# Patient Record
Sex: MALE | Race: WHITE | Employment: FULL TIME | URBAN - METROPOLITAN AREA
[De-identification: names, ages, dates, MRNs, and addresses within clinical notes are randomized per-mention and may not be internally consistent; named-entity substitution may affect disease eponyms.]

---

## 2017-04-28 PROBLEM — R22.1 NECK MASS: Status: ACTIVE | Noted: 2017-04-28

## 2017-11-07 NOTE — LETTER
2025    Efraín Dykes  : 1960    Dear Ronnie Turner MD,   Your patient, Efraín Dykes, is scheduled to have Lumbar 3 - Lumbar 4, Lumbar 4 - Lumbar 5 Decompression Fusion with Dr. Jeannine Acevedo MD. This procedure will be Inpatient at [facility] on 25.    Medical clearance is necessary.  Required preoperative testing to be ordered:  H&P  CBC with differential and platelet   CMP  PTT/PT/INR  UA with reflex to culture  MRSA/MSSA nasal swab  Chest X-ray (50 years or older)  EKG (tracing needed)  Per Dr. Acevedo's spine surgery guidelines, patient must hold Aspirin for 10 days prior to surgery and restart 7 days after surgery.  Please advise if the patient can hold Aspirin for 10 days prior to surgery and restart 7 days after surgery.    Please fax all clearances directly to the surgical facility [phone] and fax a copy to the surgeon's office at [phone] attention: [place].    For any questions, please contact our office directly at [phone].     Sincerely,   Dr. Jeannine Acevedo MD      
Yes

## 2018-05-30 PROBLEM — S90.851A FOREIGN BODY IN RIGHT FOOT, INITIAL ENCOUNTER: Status: ACTIVE | Noted: 2018-05-30

## 2018-08-30 PROBLEM — L60.0 INGROWN NAIL OF GREAT TOE OF LEFT FOOT: Status: ACTIVE | Noted: 2018-08-30

## 2018-10-25 PROCEDURE — 86803 HEPATITIS C AB TEST: CPT | Performed by: INTERNAL MEDICINE

## 2018-10-28 PROBLEM — R97.20 PSA ELEVATION: Status: ACTIVE | Noted: 2018-10-28

## 2019-02-05 ENCOUNTER — LAB ENCOUNTER (OUTPATIENT)
Dept: LAB | Age: 59
End: 2019-02-05
Attending: UROLOGY
Payer: COMMERCIAL

## 2019-02-05 DIAGNOSIS — N50.0 BILATERAL TESTICULAR ATROPHY: Primary | ICD-10-CM

## 2019-02-05 LAB
PSA FREE MFR SERPL: 16 %
PSA FREE SERPL-MCNC: 0.7 NG/ML
PSA SERPL-MCNC: 4.4 NG/ML (ref 0.01–4)
TESTOST SERPL-MCNC: 270.2 NG/DL (ref 241–827)

## 2019-02-05 PROCEDURE — 84154 ASSAY OF PSA FREE: CPT

## 2019-02-05 PROCEDURE — 84153 ASSAY OF PSA TOTAL: CPT

## 2019-02-05 PROCEDURE — 84403 ASSAY OF TOTAL TESTOSTERONE: CPT

## 2019-11-25 PROBLEM — M06.4 UNDIFFERENTIATED INFLAMMATORY POLYARTHRITIS (HCC): Status: RESOLVED | Noted: 2019-11-25 | Resolved: 2019-11-25

## 2019-11-25 PROBLEM — J30.9 ALLERGIC RHINITIS: Status: ACTIVE | Noted: 2019-11-25

## 2019-11-25 PROBLEM — H69.93 ETD (EUSTACHIAN TUBE DYSFUNCTION), BILATERAL: Status: ACTIVE | Noted: 2019-11-25

## 2019-11-25 PROBLEM — R97.20 PSA ELEVATION: Status: RESOLVED | Noted: 2018-10-28 | Resolved: 2019-11-25

## 2019-11-25 PROBLEM — C61 PROSTATE CANCER (HCC): Status: ACTIVE | Noted: 2019-11-25

## 2019-11-25 PROBLEM — M06.4 UNDIFFERENTIATED INFLAMMATORY POLYARTHRITIS (HCC): Status: ACTIVE | Noted: 2019-11-25

## 2019-11-25 PROBLEM — R22.1 NECK MASS: Status: RESOLVED | Noted: 2017-04-28 | Resolved: 2019-11-25

## 2019-11-25 PROBLEM — H69.83 ETD (EUSTACHIAN TUBE DYSFUNCTION), BILATERAL: Status: ACTIVE | Noted: 2019-11-25

## 2019-11-25 PROBLEM — L60.0 INGROWN NAIL OF GREAT TOE OF LEFT FOOT: Status: RESOLVED | Noted: 2018-08-30 | Resolved: 2019-11-25

## 2019-11-25 PROBLEM — S90.851A FOREIGN BODY IN RIGHT FOOT, INITIAL ENCOUNTER: Status: RESOLVED | Noted: 2018-05-30 | Resolved: 2019-11-25

## 2019-11-25 PROBLEM — H90.6 MIXED CONDUCTIVE AND SENSORINEURAL HEARING LOSS OF BOTH EARS: Status: ACTIVE | Noted: 2019-10-14

## 2020-11-30 PROBLEM — K43.9 VENTRAL HERNIA WITHOUT OBSTRUCTION OR GANGRENE: Status: ACTIVE | Noted: 2020-11-30

## 2021-11-17 PROBLEM — M79.602 LEFT ARM PAIN: Status: ACTIVE | Noted: 2021-11-17

## 2021-11-17 PROBLEM — M77.12 LEFT LATERAL EPICONDYLITIS: Status: ACTIVE | Noted: 2021-11-17

## 2021-11-17 PROBLEM — H40.9 GLAUCOMA: Status: ACTIVE | Noted: 2021-01-11

## 2021-11-17 PROBLEM — K51.90 ULCERATIVE COLITIS (HCC): Status: ACTIVE | Noted: 2021-01-11

## 2025-02-24 ENCOUNTER — TELEMEDICINE (OUTPATIENT)
Dept: SURGERY | Facility: CLINIC | Age: 65
End: 2025-02-24
Payer: MEDICARE

## 2025-02-24 DIAGNOSIS — M51.26 HNP (HERNIATED NUCLEUS PULPOSUS), LUMBAR: ICD-10-CM

## 2025-02-24 DIAGNOSIS — M48.062 SPINAL STENOSIS OF LUMBAR REGION WITH NEUROGENIC CLAUDICATION: Primary | ICD-10-CM

## 2025-02-24 DIAGNOSIS — M43.17 SPONDYLOLISTHESIS OF LUMBOSACRAL REGION: ICD-10-CM

## 2025-02-24 NOTE — PROGRESS NOTES
[facility] - ORTHOPEDICS  [phone]     SPINE CONSULTATION        The following individual(s) verbally consented to be recorded using ambient AI listening technology and understand that they can each withdraw their consent to this listening technology at any point by asking the clinician to turn off or pause the recording:    Patient name: Efraín Dykes  Additional names:           Name: Efraín Dykes   MRN: [id number]  Date: 2/24/2025     CC: No chief complaint on file.       HPI:   History of Present Illness  Efraín Dykes is a 64 year old male with spinal stenosis and spondylolisthesis who presents with severe left leg pain and inability to bear weight. He was referred by Dr. Dunn for management of his back and leg pain.    He experiences severe left leg pain that radiates down the leg, affecting the shin and thigh, with varying locations. The pain is exacerbated at night, disrupting his sleep, and he frequently changes positions to alleviate discomfort. He is unable to bear weight on the left leg and relies on crutches for mobility.    His history of back pain worsened midway through last year, leading to a spine x-ray and MRI. The MRI from July 2024 revealed arthritis, stenosis at L4-5, and a trace left eccentric disc bulge without discrete herniation. Physical therapy initiated in January 2025 did not provide relief.    On February 7, 2025, he experienced a significant increase in pain, described as an 'attack,' with pain levels reaching 7-8 out of 10. A lumbar epidural steroid injection on February 18, 2025, offered temporary relief, but pain returned to 8 out of 10 by February 21, 2025. He takes hydrocodone as needed for pain management, and currently, his pain is at a 2-3 out of 10.    He was previously active, engaging in pickleball, cycling, and walking dogs until the recent exacerbation of symptoms. He ceased physical therapy on February 7, 2025, due to the severity of  his symptoms.    No foot drop or tripping on the left leg.        PMH:   Past Medical History:    Essential hypertension    Hearing loss    DREW (obstructive sleep apnea)    AHI 8 RDI 16 REM AHI 12 SaO2 murali 81 %    Prostate cancer (HCC)    Ulcerative colitis (HCC)       PAST SURGICAL HX:  Past Surgical History:   Procedure Laterality Date    Colectomy  1999       FAMILY HX:  History reviewed. No pertinent family history.    ALLERGIES:  Patient has no known allergies.    MEDICATIONS:   Current Outpatient Medications   Medication Sig Dispense Refill    rosuvastatin 5 MG Oral Tab Take 1 tablet (5 mg total) by mouth daily. 90 tablet 3    metFORMIN HCl  MG Oral Tablet 24 Hr Take 1 tablet (500 mg total) by mouth 2 (two) times a day. 180 tablet 1    Losartan Potassium-HCTZ 100-12.5 MG Oral Tab Take 1 tablet by mouth daily. 90 tablet 3    Glucosamine Sulfate 500 MG Oral Tab 1 or 2 tablets daily. Follow 's instructions 60 tablet 11    Fluticasone Propionate (FLONASE) 50 MCG/ACT Nasal Suspension 2 sprays by Nasal route daily. 3 Bottle 1    Timolol Maleate 0.5 % Ophthalmic Solution   1    BABY ASPIRIN OR Take by mouth.         ROS: A comprehensive 14 point review of systems was performed and was negative aside from the aforementioned per history of present illness.    SOCIAL HX:  Social History     Tobacco Use    Smoking status: Former    Smokeless tobacco: Never   Substance Use Topics    Alcohol use: No     Alcohol/week: 0.0 standard drinks of alcohol         PE:   Physical Exam           Constitutional:       Appearance: Normal appearance.   Psychiatric:         Mood and Affect: Mood normal.     Neuro Exam:  LE Strength: 5/5 IP Quad TA EHL GSC  LE Sensation: normal in L2-S1 distribution  LE reflexes: normal    Straight leg raise negative        Radiographic Examination/Diagnostics:  I personally viewed, independently interpreted imaging and radiology report was reviewed.  Results  RADIOLOGY  Lumbar spine  MRI: Arthritis, spinal stenosis at L4-L5, possible stenosis at L3-L4, trace left eccentric disc bulge at L5-S1, left paracentral zone annular fissure without discrete disc herniation (07/2024)  Lumbar spine X-ray: Anterolisthesis of L4 over L5, spondylolisthesis (07/2024)        IMPRESSION: Efraín Dykes is a 64 year old male   Assessment & Plan  Lumbar Spinal Stenosis with Spondylolisthesis  Chronic lumbar spinal stenosis with spondylolisthesis at L4-L5 is confirmed by MRI and X-ray. Severe radicular pain and significant functional impairment are reported. An initial epidural steroid injection provided temporary relief; current pain level is 2-3/10 with hydrocodone. Physical therapy was discontinued due to symptom exacerbation. Given the severity and recent exacerbation, a new MRI is warranted to assess for potential disc herniation or other changes since July 2024. Potential surgical intervention was discussed if conservative measures fail, with risks including infection, bleeding, and nerve damage, and benefits including pain relief and improved function. Alternative treatments include continued conservative management with physical therapy and pain management. Order a new MRI of the lumbar spine. Schedule a follow-up with RISA Mar for a physical examination of the left leg. Continue with the scheduled epidural steroid injection on March 11. Discuss potential surgical options if pain persists after the second injection. Schedule a follow-up with RISA Mar, review MRI results, and reassess the treatment plan. Consider surgical consultation if conservative measures fail.    General Health Maintenance  Generally active, engaging in pickleball, cycling, and walking dogs. Encourage continuation of physical activity as tolerated. Monitor for any new or worsening symptoms and report promptly.      ICD-10-CM    1. Spinal stenosis of lumbar region with neurogenic claudication  M48.062 MRI SPINE LUMBAR  (CPT=72148)      2. Spondylolisthesis of lumbosacral region  M43.17 MRI SPINE LUMBAR (CPT=72148)      3. HNP (herniated nucleus pulposus), lumbar  M51.26 MRI SPINE LUMBAR (CPT=72148)           Efraín and JALIL went over imaging, prior treatments and arranged for a plan that incorporated personal goals, social circumstances and any current medical challenges.      After thorough discussion Efraín will move forward with injections and further non-operative care.      Greater than 45 minutes of total time was required in the care of the patient today.  Note to patient: The 21st Century Cures Act makes medical notes like these available to patients in the interest of transparency. However, be advised this is a medical document. It is intended primarily as peer to peer communication, is written in medical language, and may contain abbreviations or verbiage that are unfamiliar. This document is intended to carry relevant information, facts as evident, and the clinical opinion of the practitioner at the time of writing.     WINDY Acevedo MD  Spine Surgeon  [facility]   [email or web address], [place]  t: [phone]  f: [phone]        This note was dictated using Dragon software.  While it was briefly proofread prior to completion, some grammatical, spelling, and word choice errors due to dictation may still occur.

## 2025-03-04 ENCOUNTER — HOSPITAL ENCOUNTER (OUTPATIENT)
Dept: MRI IMAGING | Age: 65
Discharge: HOME | End: 2025-03-04
Attending: ORTHOPAEDIC SURGERY
Payer: MEDICARE

## 2025-03-04 DIAGNOSIS — M43.17 SPONDYLOLISTHESIS OF LUMBOSACRAL REGION: ICD-10-CM

## 2025-03-04 DIAGNOSIS — M51.26 HNP (HERNIATED NUCLEUS PULPOSUS), LUMBAR: ICD-10-CM

## 2025-03-04 DIAGNOSIS — M48.062 SPINAL STENOSIS OF LUMBAR REGION WITH NEUROGENIC CLAUDICATION: ICD-10-CM

## 2025-03-04 PROCEDURE — 72148 MRI LUMBAR SPINE W/O DYE: CPT | Performed by: ORTHOPAEDIC SURGERY

## 2025-03-05 ENCOUNTER — PATIENT MESSAGE (OUTPATIENT)
Dept: SURGERY | Facility: CLINIC | Age: 65
End: 2025-03-05

## 2025-03-05 NOTE — TELEPHONE ENCOUNTER
Called and spoke w/ patient.   Scheduled patient for f/u appointment to review MRI results w/ Misha FREEMAN per Dr. Curtis HUNT note.   Appt date/time/location provided to patient.     Future Appointments   Date Time Provider Department Center   3/13/2025  2:30 PM [id number]

## 2025-03-13 ENCOUNTER — OFFICE VISIT (OUTPATIENT)
Dept: ORTHOPEDICS CLINIC | Facility: CLINIC | Age: 65
End: 2025-03-13
Payer: MEDICARE

## 2025-03-13 ENCOUNTER — HOSPITAL ENCOUNTER (OUTPATIENT)
Dept: GENERAL RADIOLOGY | Age: 65
Discharge: HOME | End: 2025-03-13
Attending: PHYSICIAN ASSISTANT
Payer: MEDICARE

## 2025-03-13 DIAGNOSIS — M43.16 SPONDYLOLISTHESIS OF LUMBAR REGION: Primary | ICD-10-CM

## 2025-03-13 DIAGNOSIS — M43.16 SPONDYLOLISTHESIS OF LUMBAR REGION: ICD-10-CM

## 2025-03-13 PROCEDURE — 72110 X-RAY EXAM L-2 SPINE 4/>VWS: CPT | Performed by: PHYSICIAN ASSISTANT

## 2025-03-13 PROCEDURE — 99214 OFFICE O/P EST MOD 30 MIN: CPT | Performed by: PHYSICIAN ASSISTANT

## 2025-03-13 RX ORDER — ACETAMINOPHEN 325 MG/1
2 TABLET ORAL EVERY 4 HOURS PRN
COMMUNITY
Start: 2024-11-20

## 2025-03-13 NOTE — PROGRESS NOTES
Patient: Efraín Dykes  Medical Record Number: [id number]  Site: [place]  Referring Physician:  No ref. provider found  PCP: Ronnie Turner MD        HISTORY OF CHIEF COMPLAINT:    Efraín Dykes is a 64 year old male, who complains of two month h/o left sided radiating LBP.  Denies saddle anesthesia or incontinence.  Has a long history of low back pain.  Several weeks ago he started developing pain down his left anterior thigh to he has numbness in the same region.  He has been feeling weak with his left leg.  He was seeing pain management who just had an injection 2 days ago on him.  It was an L3-4 and L4-5 TFESI.  Since then his pain has significantly improved but he continues to have weakness in the left leg.  He had seen Dr. Acevedo who recommended a new MRI which she is here to review.  He had physical therapy but the pain was worse with physical therapy.  He is now able to slightly walk better since having the injection.  He is sleeping better since having the injection.    VAS Pain Score: 2 /10        Past Medical History:    Essential hypertension    Hearing loss    DREW (obstructive sleep apnea)    AHI 8 RDI 16 REM AHI 12 SaO2 murali 81 %    Prostate cancer (HCC)    Ulcerative colitis (HCC)      Past Surgical History:   Procedure Laterality Date    Colectomy  1999      History reviewed. No pertinent family history.   Social History     Socioeconomic History    Marital status:    Tobacco Use    Smoking status: Former    Smokeless tobacco: Never   Substance and Sexual Activity    Alcohol use: No     Alcohol/week: 0.0 standard drinks of alcohol    Drug use: No      Current Medications:  Current Outpatient Medications   Medication Sig Dispense Refill    acetaminophen 325 MG Oral Tab Take 2 tablets (650 mg total) by mouth every 4 (four) hours as needed.      rosuvastatin 5 MG Oral Tab Take 1 tablet (5 mg total) by mouth daily. 90 tablet 3    metFORMIN HCl  MG Oral Tablet 24 Hr Take 1 tablet (500 mg  total) by mouth 2 (two) times a day. 180 tablet 1    Losartan Potassium-HCTZ 100-12.5 MG Oral Tab Take 1 tablet by mouth daily. 90 tablet 3    Glucosamine Sulfate 500 MG Oral Tab 1 or 2 tablets daily. Follow 's instructions 60 tablet 11    BABY ASPIRIN OR Take by mouth.      Cholecalciferol 50 MCG (2000 UT) Oral Tab Take 50 mcg by mouth daily.      Fluticasone Propionate (FLONASE) 50 MCG/ACT Nasal Suspension 2 sprays by Nasal route daily. 3 Bottle 1    Timolol Maleate 0.5 % Ophthalmic Solution   1              IMAGING STUDIES:  X-rays were reviewed with the patient today  X-rays  Images were reviewed and discussed with the patient.  They voiced understanding of what the images showed.  MRI SPINE LUMBAR (CPT=72148)    Result Date: 3/5/2025  PROCEDURE:  MRI SPINE LUMBAR (CPT=72148)  COMPARISON:  None.  INDICATIONS:  M51.26 HNP (herniated nucleus pulposus), lumbar M43.17 Spondylolisthesis of lumbosacral region M48.062 Spinal stenosis of lumbar region with neurogenic claudic*  TECHNIQUE:  Multiplanar T1 and T2 weighted images including fat suppression sequences.  Images acquired in sagittal and axial planes.   PATIENT STATED HISTORY: (As transcribed by Technologist)  Patient has lower back pain radiating down his left leg which started about 4 weeks ago with no known injury or fall     FINDINGS:   Small disc protrusion to the right of midline at T11-12 is partially imaged.  LUMBAR DISC LEVELS L1-L2:  Mild degenerative broad-based disc bulge.  There is no central or foraminal stenosis.  The facet joints are unremarkable. L2-L3:  There is mild broad-based degenerative disc bulge.  There is no central or foraminal stenosis.  The facet joints are unremarkable. L3-L4:  There is moderate broad-based degenerative disc bulge.  There is moderate bilateral facet joint degenerative change.  AP diameter canal is narrowed to 9 mm consistent mild central canal stenosis.  There is moderate bilateral subarticular zone and   neural foraminal narrowing. L4-L5:  There is moderate broad-based degenerative disc bulge.  There is moderate bilateral facet joint degenerative change.  There is mild ligamentum flavum hypertrophy.  This causes mild to moderate central canal stenosis of 8 mm. There is moderate subarticular zone and neural foraminal narrowing bilaterally slightly worse on the left. L5-S1:  Mild broad-based degenerative disc bulge.  There is moderate bilateral facet joint degenerative change.  There is mild bilateral neural foraminal narrowing.  PARASPINAL AREA:  Normal with no visible mass.  BONY STRUCTURES:  No fracture, pars defect, significant scoliosis, or osseous lesion.  Hemangioma within T12. CORD/CAUDA EQUINA:  Normal caliber, contour, and signal intensity.             CONCLUSION:   1. Moderate degenerative disc bulge osteophyte complex at L3-4 causing mild central canal stenosis and moderate bilateral subarticular zone and neural foraminal narrowing.  2. Moderate degenerative disc bulge and ligamentum flavum hypertrophy and facet joint degenerative changes at L4-5 causing mild/moderate central canal stenosis and moderate subarticular zone and neural foraminal narrowing slightly worse on the left.   LOCATION:  [facility]   Dictated by (CST): Raul Liang MD on 3/05/2025 at 10:19 AM     Finalized by (CST): Raul Liang MD on 3/05/2025 at 10:29 AM          EXAM: X-RAY OF LUMBAR SPINE     CLINICAL INFORMATION: Back pain     FINDINGS:     AP, Lateral with flexion/extension views of lumbar spine completed.   5 lumbar vertebral bodies seen.   Spondylosis is seen with disc space loss and sclerotic endplate changes and facet joint hypertrophy most notable at L3-5     No spondylolysis   L4-5 spondylolisthesis.  No fractures   No destructive lesions seen.        IMPRESSION:    Lumbar spondylosis as detailed above      PHYSICAL EXAMIMATION   PHYSICAL EXAMINATION: Efraín Dykes is a 64 year old   male who is observed sitting  comfortably in the exam room alert and oriented times three. RESPIRATORY RATE: 18 He looks consistent his stated age.    There were no vitals taken for this visit.  The patient is well developed, well nourished, thin body habitus, well muscled.       Inspection: No acute distress.   Patient displays antalgic gait, and is able to normal heel walk, normal toe walk.     Coordination: Well coordinated, Fluid gait      ROM:  Forward Flexion  Pain free    Extension  Pain     Palpation:  See chart below:  Palpation of Lumbar Area Right   (POS or NEG) Left   (POS or NEG)   Lumbar paraspinals Neg Neg   SIJ Neg Neg   PSIS Neg Neg   Trochanteric Bursa Neg Neg     Strength:      DTR's:     Left Right    Left Right    EHL 5/5 5/5  Patella  2+/4 2+/4    DF 5/5 5/5  Achilles  2+/4 2+/4    PF 5/5 5/5    Quad 4+/5 5/5    IP 4/5 5/5    Sensation:  Sensory deficits noted on bilateral lower extremities to light touch: none    Tests:  Test Right   (POS or NEG) Left   (POS or NEG)   SLR Neg Neg   Clonus Neg Neg      Calves supple, NT   MUSCULOSKELETAL: Fluid, pain-free ROM to bilateral: ankles, knees  & hips                                                                                     MEDICAL DECISION MAKING:   Impression: Lumbar Spine:  Lumbar Spondylosis 721.3   Lumbar Spinal Stenosis 724.02   Spondylolisthesis Degenerative 738.4     Plan: We discussed the diagnosis and treatment options today, including rationale for surgical vs non-surgical options.  The patient has significant foraminal narrowing at left side of L3-4.  He has a spondylolisthesis at L4-5 with stenosis.  He is feeling significant relief of his pain since his last transforaminal injection that he had on Tuesday.  He still feels weak with his left leg.  He is going to follow-up with Dr. Acevedo in 2 weeks.  If he continues to make progress in regards to his pain and weakness then we will continue with conservative care.  If he still feels weak with his left leg then  I we will have Dr. Acevedo to have a surgical discussion with him.  Today we did discuss L3-5 decompression and interbody fusion.  Patient voiced understanding of what the surgery involves and the typical risk associated with it.  His concerns were addressed.  Follow-up in 2 weeks.   Restrictions and limitations were reviewed with the patient.  Concerns were addressed.  Questions were encouraged and answered.  Patient voiced understanding.  Images were reviewed and discussed with the patient.  They voiced understanding of what the images showed.    The patient indicates understanding of these issues and agrees to the plan.    Thank you very much.     Respectfully yours,    Misha Mar PA-C    This note was dictated using Dragon software. While it was briefly proofread prior to completion, some grammatical, spelling, and word choice errors due to dictation may still occur.

## 2025-03-14 ENCOUNTER — PATIENT MESSAGE (OUTPATIENT)
Age: 65
End: 2025-03-14

## 2025-04-01 ENCOUNTER — TELEMEDICINE (OUTPATIENT)
Dept: SURGERY | Facility: CLINIC | Age: 65
End: 2025-04-01
Payer: MEDICARE

## 2025-04-01 ENCOUNTER — TELEPHONE (OUTPATIENT)
Age: 65
End: 2025-04-01

## 2025-04-01 DIAGNOSIS — M48.062 SPINAL STENOSIS OF LUMBAR REGION WITH NEUROGENIC CLAUDICATION: Primary | ICD-10-CM

## 2025-04-01 DIAGNOSIS — M54.16 LUMBAR RADICULOPATHY: ICD-10-CM

## 2025-04-01 DIAGNOSIS — M51.26 HNP (HERNIATED NUCLEUS PULPOSUS), LUMBAR: ICD-10-CM

## 2025-04-01 DIAGNOSIS — M43.17 SPONDYLOLISTHESIS OF LUMBOSACRAL REGION: ICD-10-CM

## 2025-04-01 DIAGNOSIS — M43.8X9 SAGITTAL PLANE IMBALANCE: ICD-10-CM

## 2025-04-01 PROCEDURE — 99214 OFFICE O/P EST MOD 30 MIN: CPT | Performed by: ORTHOPAEDIC SURGERY

## 2025-04-01 NOTE — PROGRESS NOTES
[facility] - SPINE SURGERY  [phone]     SPINE PROGRESS NOTE         The following individual(s) verbally consented to be recorded using ambient AI listening technology and understand that they can each withdraw their consent to this listening technology at any point by asking the clinician to turn off or pause the recording:    Patient name: Efraín Dykes  Additional names:          Name: Efraín Dykes   MRN: [id number]  Date: 4/1/2025     The following note documents an audio visual tel-health visit.    HPI:   History of Present Illness  Efraín Dykes is a 64 year old male who presents with left leg pain and weakness.    He has been experiencing pain radiating down his left leg for approximately eight weeks, starting from an 'attack'. The pain extends from the upper thigh to the bottom of the shin and is exacerbated by weight-bearing activities. He can walk about 300 feet with crutches before needing to sit down due to increased pain. While sitting, his pain level is about 1 out of 10, but it increases significantly with weight-bearing. He also reports weakness in his left leg, particularly when climbing stairs, requiring him to use his hands for assistance.    A prior MRI from March 4, 2025, revealed foraminal disc herniation at L4-5, causing pressure on the dorsal root ganglion, and mild stenosis at L3-4. X-rays show narrowing between the disc spaces and instability at L4-5.    He has undergone two epidural injections; the first was ineffective, and the second provided marginal relief. Despite these interventions, he continues to experience significant pain and has difficulty sleeping at night.    He is currently using crutches to aid in mobility and the pain significantly impacts his quality of life, stating, 'I can't live like this.' He is concerned about his ability to perform daily activities and exercise due to the pain and weakness. No pain in the right leg, only the left.  No significant headaches.      PE: Patient relates significant difficulty involving the left lower extremity.  With quadriceps weakness as well as tibialis anterior weakness with walking and getting up and down stairs.  Details are also within Misha Mar's previous visit with the patient.          Radiographic Examination/Diagnostics:  I personally viewed, independently interpreted and radiology report was reviewed.  Results  RADIOLOGY  Lumbar spine MRI: Mild stenosis at L3-4, significant stenosis at L4-5 with foraminal disc herniation pressing on the dorsal root ganglion, fluid in facet joints indicating instability. (03/04/2025)  Lumbar spine X-ray: Narrowing of disc space, instability between L4 and L5, step off at L4-5, stenosis at L4-5.  Pelvic incidence lumbar lordosis measurements do show mismatch in excess of 20 degrees        IMPRESSION: Efraín Dykes is a 64 year old male L4-5 severe stenosis spondylolisthesis with foraminal disc herniation, L3-4 stenosis kyphosis  Assessment & Plan  Lumbar Spinal Stenosis with Spondylolisthesis and Foraminal Disc Herniation    He has experienced radicular pain in the left leg for eight weeks, with significant weakness and difficulty walking. MRI shows mild stenosis at L3-4 and severe stenosis at L4-5, with foraminal disc herniation impinging on the dorsal root ganglion. X-rays confirm L4-5 instability. Epidural injections provided limited relief, and persistent pain and weakness necessitate surgical intervention. Posterior lumbar fusion at L3-4 and L4-5 is proposed for nerve decompression and spinal stabilization. Surgical risks include bleeding, infection, and nerve injury (1-2% risk). Recovery is expected in four to six weeks, with 90% recovery by three months. There is an 85% chance of pain improvement. He is a suitable surgical candidate due to symptom severity and lack of response to conservative treatment. Alternative surgical options, such as decompression alone  or endoscopic decompression, are unsuitable due to L4-5 instability. Minimally invasive posterior lumbar fusion is recommended for decompression and stabilization. Schedule posterior lumbar fusion at L3-4 and L4-5. Ensure medical and insurance clearance for surgery. Provide educational resources on the procedure, including a link to the provider's website. Arrange a pre-operative consultation with the surgical team.    Follow-up    He is advised to review educational materials and discuss questions with the surgical team. Follow-up with the pain management specialist is planned, but the focus is on surgical intervention due to weakness and instability. A third epidural injection is not recommended as it does not address weakness. Nurse Ani Sanderson will contact him for scheduling and additional questions. Coordinate with pain management specialist, Dr. Murdock, regarding the surgical plan.        ICD-10-CM    1. Spinal stenosis of lumbar region with neurogenic claudication  M48.062       2. Spondylolisthesis of lumbosacral region  M43.17       3. HNP (herniated nucleus pulposus), lumbar  M51.26       4. Lumbar radiculopathy  M54.16            PLAN: Sumaya went over imaging, prior treatments and arranged for a plan that incorporated personal goals, social circumstances and any current medical challenges.     We had a thorough discussion going over the risks and benefits of a lumbar fusion.  The risks and benefits were discussed in significant detail. Risks, including but not limited to, wound complications and infection (particularly in diabetics and the obese), blood loss,DVT/PE, back pain, dural injury, nerve injury, persistent symptoms, recurrence, pseudoarthrosis, adjacent segment degeneration/disease, possible need for future surgery, complications associated with anesthesia including death, were discussed with the patient. We discussed perioperative wound care, activity restrictions/limitations, expectations  and recovery. The patient verbalizes their understanding.     The patient had failed reasonable conservative measures which are outlined in the patient's clinic medical record.  Physical therapy, medical management, injections, alternative treatment are among those offered unless motor deficits are progressive.  Indications for surgery are based on scientific literature and evidence based guidelines.  A thorough meeting with the patient and at least one key caregiver was had.    .  I have gone over the operation using models, diagrams, and the patient's own imaging studies.  Additional written and digital sources were provided.  No guarantees were made.    Additional information regarding an instrumented posterior lumbar fusion operation can be found at   [email or web address]          Greater than 30 minutes of total time was required in the care of the patient today.  Note to patient: The 21st Century Cures Act makes medical notes like these available to patients in the interest of transparency. However, be advised this is a medical document. It is intended primarily as peer to peer communication, is written in medical language, and may contain abbreviations or verbiage that are unfamiliar. This document is intended to carry relevant information, facts as evident, and the clinical opinion of the practitioner at the time of writing.       WINDY Acevedo MD  Orthopedic Spine Surgeon  [facility]   [email or web address], [employer]  t: [phone]  f: [phone]        This note was dictated using Dragon software.  While it was briefly proofread prior to completion, some grammatical, spelling, and word choice errors due to dictation may still occur.

## 2025-04-01 NOTE — TELEPHONE ENCOUNTER
SURGERY SCHEDULING SHEET    Efraín Dykes  5/12/1960  [id number]    Procedure: L3-4 L4-5 decompression fusion    Diagnosis:      ICD-10-CM    1. Spinal stenosis of lumbar region with neurogenic claudication  M48.062       2. Sagittal plane imbalance  M43.8X9       3. HNP (herniated nucleus pulposus), lumbar  M51.26       4. Lumbar radiculopathy  M54.16       5. Spondylolisthesis of lumbosacral region  M43.17           Disposition: Inpatient    Anesthesia: General     TLIF 2 Levels -- Time(hr): 2.0 -- Table: Twin-Proaxis -- Hardware: Reline PPS -- Interbody: Cohere Oblique Cage/LO2 -- Biologics: Attrax/BMP -- NM: Yes-- CPT:  49269 - TLIF first level  90375 - Add level  28462 - 1-2 segmental instrumentation   35896 - Central decompression w/ TLIF  76031 - Additional level   98553 - Cage x2  20931 - Allograft   25522 - Posterior osteotomy  48308 - add level         Laterality: LEFT    Assist: Misha Mar PA-C    C-Arm: Yes    Pre-op Testing: Per anesthesia guidelines.     Clearance: H&P/medical clearance. CBC w/ differential, CMP, UA w/ reflex to culture, PTT/PT/INR, MRSA/MSSA nasal swab, EKG, Chest xray (50 years or older)  Type and screen    Imaging: N/A    Brace: Lumbar Chair Back (LSO)    Other: Bone Stimulator     Home health: Yes    Prehab: Yes    Post op: 2 week post op w/ PETE Acevedo MD  [facility] Spine Surgery  Phone: [phone]  Fax: [phone]

## 2025-04-04 NOTE — TELEPHONE ENCOUNTER
DIAGNOSIS: Spinal stenosis of lumbar region with neurogenic claudication [M48.062]  PROCEDURE: L3-4 L4-5 decompression fusion   SURGERY DATE: 4/22/25    INFORMATION BELOW IS PATIENT REPORTED:   PCP Name: Ronnie Turner MD  PCP Phone/Fax:     Sleep Apnea: No  Diabetic: No A1C:       Blood Thinner: Yes: Aspirin 81mg daily   PM/Defib/DM monitor: No    Specialists:   [] Cardiologist:  [] Pulmonologist:  [] Hematologist:  [] ENT:  [] Other:   [x] None    Latex Allergy: no    Imaging:  Xray: yes 3/13/25 Lumbar - [facility]    MRI: yes 3/4/25 Lumbar - [facility]   CT: no    Physical Therapy: yes  PTO Location: [facility]   [] > 1 year [x]  < 1 year    Injection(s): yes  MDO Location: [facility]   [] > 1 year [x]  < 1 year    ETOH: Yes: 4 per week    Nicotine/Tobacco: None    Illicit/Recreational Drugs: No    Other:

## 2025-04-04 NOTE — TELEPHONE ENCOUNTER
Surgery Date: 4/22/25  Surgery Location: [facility]   Surgery: L3-L5 Decompression Interbody Fusion    []  Referral authorized  []  PCP/H&P clearance  []  Labs - CMP, CBC w/ diff, PT/PTT/INR, MRSA/MSSA, UA w/ reflex to culture  []  MRSA/MSSA treated if +  []  EKG (tracing needed)  []  Chest x-ray (50 or older)  []  Cardiac Clearance PRN  []  Pulm Clearance PRN  []  Other Specialist Clearance PRN  []  Post op follow up scheduled   [x]  Spine Pre Op Letter   [x]  Imaging (has xray and MRI)  []  Spine Pre op w/ Spine Provider scheduled   [x]  DME  []  [place]

## 2025-04-10 ENCOUNTER — TELEPHONE (OUTPATIENT)
Dept: ORTHOPEDICS CLINIC | Facility: CLINIC | Age: 65
End: 2025-04-10

## 2025-04-10 NOTE — TELEPHONE ENCOUNTER
Spine pre op letter re-faxed to [phone].   Called and spoke w/ [place] from PCP Dr. Turner's office.   Advised letter was re-faxed. Also provided info verbally.

## 2025-04-10 NOTE — TELEPHONE ENCOUNTER
[place] with pt's PCP's office Dr. Turner called to ask for pre-op testing orders. Please call her back this morning. Patient has a pre-op appt today at 3:00 pm.    Fax # [phone]

## 2025-04-22 DIAGNOSIS — M48.062 SPINAL STENOSIS OF LUMBAR REGION WITH NEUROGENIC CLAUDICATION: Primary | ICD-10-CM

## 2025-04-22 DIAGNOSIS — M43.8X9 SAGITTAL PLANE IMBALANCE: ICD-10-CM

## 2025-04-22 DIAGNOSIS — M54.16 LUMBAR RADICULOPATHY: ICD-10-CM

## 2025-04-22 DIAGNOSIS — M41.50 SCOLIOSIS DUE TO DEGENERATIVE DISEASE OF SPINE IN ADULT PATIENT: ICD-10-CM

## 2025-04-22 DIAGNOSIS — M43.17 SPONDYLOLISTHESIS OF LUMBOSACRAL REGION: ICD-10-CM

## 2025-04-22 NOTE — PROCEDURES
WINDY ACEVEDO MD, Spine Section [facility]  Operative Note         Efraín Dykes Location: OR   [facility] [id number] MRN [id number]   Admission Date DATE OF SURG Operation Date 4/22/2025   Attending Physician WINDY Acevedo MD    NPI [id number]      Patient Name: Efraín Dykes     Preoperative Diagnosis: L3-4 and L4-5 stenosis, spondylolisthesis, sagittal imbalance     Postoperative Diagnosis:SAME    Procedure Performed: L3-4 and L4-5 Posterior interbody fusion and decompression      Primary Surgeon: WINDY Acevedo MD     Assistant: Misha Mar PA-C (NPI #[id number])    The above skilled and experienced assistant was required for the entire surgical procedure. As a lumbar spinal reconstruction, the procedure is done in immediate proximity to critical neural elements and is done in close proximity to the critical vascular and visceral structures. Much of the surgery is done in and around the cauda equine and its exiting nerves.  The complex reconstructive nature of the procedure requires application of screws, rods, and interbody devices. Any assistance, including, but not limited to, retraction, suction, and other means of facilitation of the procedure requires an individual with substantial postgraduate training and substantial spinal surgical experience.    Anesthesia: GET     Specimen: None     Estimated Blood Loss: 150cc    Complications: none None    Indications for procedure: The patient had failed reasonable conservative measures which are outlined in the patient's clinic medical record.  Physical therapy, medical management, injections, alternative treatment are among those offered unless motor deficits are progressive.  Indications for surgery are based on scientific literature and evidence based guidelines.  A thorough meeting with the patient and at least one key caregiver was had.  The risks and benefits were discussed in significant detail.  The risks include, but are not limited to,  bleeding, infection, spinal leaks, nerve injuries, hardware failure/migration, pseudarthrosis, adjacent segment disease, and need for further surgery.  I have gone over the operation using models, diagrams, and the patient's own imaging studies.  Additional written and digital sources were provided.  No guarantees were made.    Complexity: Severe    Description of Procedure: After obtaining informed consent, the patient was taken to the operating room. SCDs and STACEY hose were applied. Preoperative antibiotics were delivered. Patient was placed in the prone position. Neuromonitoring leads were put in place and baselines were achieved. An internal twitch test verified the patient had 4 out of 4 twitches. A physician was confirmed as evaluating all neuromonitoring data. All bony prominences were padded. The back was sterilely prepped and draped. AP fluoroscopy was wheeled in.       Using aligned AP flouroscopy, we marked pedicles at all levels outlined above bilaterally. Two separate incisions 1-1/2 fingerbreadths lateral to the pedicles were completed. This was done with a #10 blade. Under flouroscopic guidance we then applied and erector spinae block using local anesthetics and potentiators Bovie electrocautery was used for hemostasis. Dissection was taken down to the level of the fascia. Fascia was split in paraspinal fashion bilaterally.     Through a separate fascial incision, bone marrow was aspirated by placing a needle through the skin and through the periosteum and into the pedicle.  A 10 mL syringe was used to aspirate several milliliters of bone marrow aspirate. The needle was removed with a twisting motion. ( BONE MARROW ASPIRATION, CPT CODE 37850)    Cannulated jamshidi style needles were utilized with live neuromonitoring attached to each needle.  Under fluoroscopic guidance, we targeted the lateral aspect of the pedicles bilaterally. These cannulated needles were then impacted into each pedicle in  nona-medial fashion but not beyond  the medial border of the pedicle. We then checked all needles on a lateral fluoroscopic view to ensure each needle was clearly through the pedicle and safely in the vertebral body.     All cannulated needles were found to be well above acceptable neuromonitoring thresholds indicating no breach of their respective pedicles. Inner styluses were removed. K-wires were applied and advanced through the needle and into the vertebral body but not breaching the anterior vertebral cortex. This was confirmed under lateral fluoroscopy. Using a measuring dilator we identified the proper depths for the cranial and caudal retractor blades. We placed a modular screw with the appropriate blade over the shivani wire and advanced the screw into the pedicle and vertebral body. We repeated the same steps at the distal and contralateral levels without difficulty. The retractor apparatus was attached to the proximal and medial blades. A medial retractor was sized and applied. All tissues were retracted out of harm’s way with regards to muscle in the paraspinal plane and an articulating arm was attached to the retractor. Bovie electrocautery was used to remove remaining tissue over the pars and facet.     Our attention first turned to the lumbar 4/5  level(s).  The facet was osteotomized and resected. A bur was used to remove additional bone. Pars was resected as well. We identified the lateral ligamentum flavum and resected that with a Buckley ball in place over the dura and a 4 and 5- mm Kerrison. The area was further decompressed from cranial to caudal pedicle using bur, Kerrison rongeurs, and other instruments. Neural elements were not harmed during this process.     A bur was used to remove additional bone. We identified the lateral ligamentum flavum and resected that with a Buckley ball in place over the dura and a 4 and 5-mm Kerrison. Contralateral decompression was undertaken thus creating a  bilateral decompression and bilateral microforaminotomy and hemilaminotomy using undercutting technique. With undercutting technique and a Buckley ball in place, we excised ligamentum flavum through the bone at the cranial foramina as needed, facilitating a serial cranial decompression and undercutting microforaminotomy and laminectomy. This was done in an undercutting fashion as well and done to examine the central and contralateral neural elements facilitating central bilateral decompression as well. The traversing nerve root was identified and thoroughly decompressed. An extraforaminal and transfacet/transpedicular decompression was utilized to perform a thorough decompression of the exiting nerve root and remove redundant annulus and any herniated nucleus pulposus.  (CENTRAL DECOMPRESSION CPT 74106)          With a retractor in place we localized the far lateral and transforaminal region of the level. We examined areas more farther laterally to facilitate a transforaminal decompression and discectomy. Nerve root retractor was applied. We were able to retract the traversing nerve root over. Bipolar eletrocautery was used for hemostasis. Annulotomy was performed. Further lateral structures and paraspinal muscles were dissected away from the area of the transverse process and further anteriorly. Curettes, germán, and pituitary rongeurs were used to remove the rest of the remaining disc in order to perform a thorough transforaminal lumbar discectomy. Additionally, posterior elements including the facets, lateral pars and other structures on the contralateral side were decorticated using a high speed elroy and curretes. Local bone was applied to complete a posterolateral fusion. (TRANSFORAMINAL  AND POSREROLATERAL LUMBAR INTERBODY FUSION CPT CODE 88134)    Endplates were prepared. Curettes and osteotomes were used to perform bony anterior release and osteotomize any anterior column ossification. We applied some  distraction across the pedicle screws. We sized for a cage. Allograft and local bone complex was impacted into the lateral disc space, and the cage was impacted as well with nerve roots safely out of harm's way. This cage was separate and distinct from any additional instrumentation and an integral part of the fusion procedure. (APPLICATION OF INTERBODY DEVICE CPT CODE 52862)    Pre-operative measurements documenting a mismatch between pelvic incidence and lumbar lordosis had been made, thus documenting clinically significant kyphosis. The sagittal imbalance greater than 20 degrees was reconfirmed under flourscopic guidance with a substantial portion of the kyphosis occurring locally. Appropriate realignment was not achievable using the above conventional means. For those reasons we proceeded with a posterior element osteotomy. We extended our release of the posterior elements to additional posterior lements including but not limited to a wide resection of facets, ligamentum flavum and intraspinous ligaments. Additional confirmation of disc space release was also confirmed to facilitate correction. We were able to later confirm correction of ten degrees or more.  (POSTERIOR ELEMENT OSTEOTOMY  LEVEL CPT CODE 26888)    Our attention then turned to the adjacent lumbar 3/4 level(s) where we repeated the same steps. The facet was osteotomized and resected. A bur was used to remove additional bone. Pars was resected as well. We identified the lateral ligamentum flavum and resected that with a Buckley ball in place over the dura and a 4 and 5- mm Kerrison. The area was further decompressed from cranial to caudal pedicle using bur, Kerrison rongeurs, and other instruments. Neural elements were not harmed during this process.     A bur was used to remove additional bone. We identified the lateral ligamentum flavum and resected that with a Buckley ball in place over the dura and a 4 and 5-mm Kerrison. Contralateral  decompression was undertaken thus creating a bilateral decompression and bilateral microforaminotomy and hemilaminotomy using undercutting technique. With undercutting technique and a Buckley ball in place, we excised ligamentum flavum through the bone at the cranial foramina as needed, facilitating a serial cranial decompression and undercutting microforaminotomy and laminectomy. This was done in an undercutting fashion as well and done to examine the central and contralateral neural elements facilitating central bilateral decompression as well. The traversing nerve root was identified and thoroughly decompressed. An extraforaminal and transfacet/transpedicular decompression was utilized to perform a thorough decompression of the exiting nerve root and remove redundant annulus and any herniated nucleus pulposus.   (CENTRAL DECOMPRESSION ADDITIONAL LEVEL CPT 27633)        With a retractor in place we localized the far lateral and transforaminal region of the level. We examined areas more farther laterally to facilitate a transforaminal decompression and discectomy. Nerve root retractor was applied. We were able to retract the traversing nerve root over. Bipolar eletrocautery was used for hemostasis. Annulotomy was performed. Further lateral structures and paraspinal muscles were dissected away from the area of the transverse process and further anteriorly. Curettes, germán, and pituitary rongeurs were used to remove the rest of the remaining disc in order to perform a thorough transforaminal lumbar discectomy. Additionally, posterior elements including the facets, lateral pars and other structures on the contralateral side were decorticated using a high speed elroy and curretes. Local bone was applied to complete a posterolateral fusion. (TRANSFORAMINAL AND POSTEROLATERAL LUMBAR INTERBODY FUSION ADDITIONAL LEVEL CPT CODE 25162)      Pre-operative measurements documenting a mismatch between pelvic incidence and lumbar  lordosis had been made, thus documenting clinically significant kyphosis. The sagittal imbalance greater than 20 degrees was reconfirmed under flourscopic guidance with a substantial portion of the kyphosis occurring locally. Appropriate realignment was not achievable using the above conventional means. For those reasons we proceeded with a posterior element osteotomy. We extended our release of the posterior elements to additional posterior lements including but not limited to a wide resection of facets, ligamentum flavum and intraspinous ligaments. Additional confirmation of disc space release was also confirmed to facilitate correction. We were able to later confirm correction of ten degrees or more.  (POSTERIOR ELEMENT OSTEOTOMY ADDITIONAL LEVEL CPT CODE 05398)        Hemostasis was obtained. Duragen was placed over the neural elements. Tulip heads were placed on the contralateral side. Then, with dilators in place, the screws were placed. Instrumentation was placed on the right over K-wires in standard technique and fluoroscopy.     Screws were further advanced under fluoroscopy. All screws were tested using real-time neuromonitoring. Thresholds were above acceptable values. Rods and top tighteners were applied bilaterally, and final tightening was applied with compression devices to make use of the osteotomy to confirm greater than ten degrees of improvement where appropriate, thereby reducing kyphosis and pelvic incidence and lordosis mismatch. X-rays confirmed appropriate localization of our instrumentation.      (POSTERIOR LUMBAR INSTRUMENTATION MULTIPLE INTERSPACES-CPT 46090)        Valsalva maneuver confirmed that there was no spinal leak. The wound was thoroughly irrigated. Hemostasis was maintained. The fascia was reapproximated bilaterally using 1-0 Vicryl. A 2-0 Vicryl was used for subcutaneous tissues. A running 3-0 subcuticular stitch was applied. Steri-Strips were applied. Sterile dressings were  applied.       Condition: The patient was extubated and taken to the recovery room in stable condition and was  neurologically intact on arrival and had improvement of leg pain. SSEPs remained at their  baseline. Needle and Ray-Wilver counts were correct at the conclusion of the case.      WINDY Acevedo MD

## 2025-04-22 NOTE — PROCEDURES
WINDY ACEVEDO MD, Spine Section [facility]  Operative Note         Efraín Dykes Location: OR   [facility] [id number] MRN [id number]   Admission Date DATE OF SURG Operation Date 4/22/2025   Attending Physician WINDY Acevedo MD    NPI [id number]      Patient Name: Efraín Dykes     Preoperative Diagnosis: L3-4 and L4-5 stenosis, spondylolisthesis, sagittal imbalance     Postoperative Diagnosis:SAME    Procedure Performed: L3-4 and L4-5 Posterior interbody fusion and decompression      Primary Surgeon: WINDY Acevedo MD     Assistant: Misha Mar PA-C (NPI #[id number])    The above skilled and experienced assistant was required for the entire surgical procedure. As a lumbar spinal reconstruction, the procedure is done in immediate proximity to critical neural elements and is done in close proximity to the critical vascular and visceral structures. Much of the surgery is done in and around the cauda equine and its exiting nerves.  The complex reconstructive nature of the procedure requires application of screws, rods, and interbody devices. Any assistance, including, but not limited to, retraction, suction, and other means of facilitation of the procedure requires an individual with substantial postgraduate training and substantial spinal surgical experience.    Anesthesia: GET     Specimen: None     Estimated Blood Loss: 150cc    Complications: none None    Indications for procedure: The patient had failed reasonable conservative measures which are outlined in the patient's clinic medical record.  Physical therapy, medical management, injections, alternative treatment are among those offered unless motor deficits are progressive.  Indications for surgery are based on scientific literature and evidence based guidelines.  A thorough meeting with the patient and at least one key caregiver was had.  The risks and benefits were discussed in significant detail.  The risks include, but are not limited to,  bleeding, infection, spinal leaks, nerve injuries, hardware failure/migration, pseudarthrosis, adjacent segment disease, and need for further surgery.  I have gone over the operation using models, diagrams, and the patient's own imaging studies.  Additional written and digital sources were provided.  No guarantees were made.    Complexity: Severe    Description of Procedure: After obtaining informed consent, the patient was taken to the operating room. SCDs and STACEY hose were applied. Preoperative antibiotics were delivered. Patient was placed in the prone position. Neuromonitoring leads were put in place and baselines were achieved. An internal twitch test verified the patient had 4 out of 4 twitches. A physician was confirmed as evaluating all neuromonitoring data. All bony prominences were padded. The back was sterilely prepped and draped. AP fluoroscopy was wheeled in.       Using aligned AP flouroscopy, we marked pedicles at all levels outlined above bilaterally. Two separate incisions 1-1/2 fingerbreadths lateral to the pedicles were completed. This was done with a #10 blade. Under flouroscopic guidance we then applied and erector spinae block using local anesthetics and potentiators Bovie electrocautery was used for hemostasis. Dissection was taken down to the level of the fascia. Fascia was split in paraspinal fashion bilaterally.     Through a separate fascial incision, bone marrow was aspirated by placing a needle through the skin and through the periosteum and into the pedicle.  A 10 mL syringe was used to aspirate several milliliters of bone marrow aspirate. The needle was removed with a twisting motion. ( BONE MARROW ASPIRATION, CPT CODE 34416)    Cannulated jamshidi style needles were utilized with live neuromonitoring attached to each needle.  Under fluoroscopic guidance, we targeted the lateral aspect of the pedicles bilaterally. These cannulated needles were then impacted into each pedicle in  nona-medial fashion but not beyond  the medial border of the pedicle. We then checked all needles on a lateral fluoroscopic view to ensure each needle was clearly through the pedicle and safely in the vertebral body.     All cannulated needles were found to be well above acceptable neuromonitoring thresholds indicating no breach of their respective pedicles. Inner styluses were removed. K-wires were applied and advanced through the needle and into the vertebral body but not breaching the anterior vertebral cortex. This was confirmed under lateral fluoroscopy. Using a measuring dilator we identified the proper depths for the cranial and caudal retractor blades. We placed a modular screw with the appropriate blade over the shivani wire and advanced the screw into the pedicle and vertebral body. We repeated the same steps at the distal and contralateral levels without difficulty. The retractor apparatus was attached to the proximal and medial blades. A medial retractor was sized and applied. All tissues were retracted out of harm’s way with regards to muscle in the paraspinal plane and an articulating arm was attached to the retractor. Bovie electrocautery was used to remove remaining tissue over the pars and facet.     Our attention first turned to the lumbar 4/5  level(s).  The facet was osteotomized and resected. A bur was used to remove additional bone. Pars was resected as well. We identified the lateral ligamentum flavum and resected that with a Buckley ball in place over the dura and a 4 and 5- mm Kerrison. The area was further decompressed from cranial to caudal pedicle using bur, Kerrison rongeurs, and other instruments. Neural elements were not harmed during this process.     A bur was used to remove additional bone. We identified the lateral ligamentum flavum and resected that with a Buckley ball in place over the dura and a 4 and 5-mm Kerrison. Contralateral decompression was undertaken thus creating a  bilateral decompression and bilateral microforaminotomy and hemilaminotomy using undercutting technique. With undercutting technique and a Buckley ball in place, we excised ligamentum flavum through the bone at the cranial foramina as needed, facilitating a serial cranial decompression and undercutting microforaminotomy and laminectomy. This was done in an undercutting fashion as well and done to examine the central and contralateral neural elements facilitating central bilateral decompression as well. The traversing nerve root was identified and thoroughly decompressed. An extraforaminal and transfacet/transpedicular decompression was utilized to perform a thorough decompression of the exiting nerve root and remove redundant annulus and any herniated nucleus pulposus.  (CENTRAL DECOMPRESSION CPT 68448)          With a retractor in place we localized the far lateral and transforaminal region of the level. We examined areas more farther laterally to facilitate a transforaminal decompression and discectomy. Nerve root retractor was applied. We were able to retract the traversing nerve root over. Bipolar eletrocautery was used for hemostasis. Annulotomy was performed. Further lateral structures and paraspinal muscles were dissected away from the area of the transverse process and further anteriorly. Curettes, germán, and pituitary rongeurs were used to remove the rest of the remaining disc in order to perform a thorough transforaminal lumbar discectomy. Additionally, posterior elements including the facets, lateral pars and other structures on the contralateral side were decorticated using a high speed elroy and curretes. Local bone was applied to complete a posterolateral fusion. (TRANSFORAMINAL  AND POSREROLATERAL LUMBAR INTERBODY FUSION CPT CODE 78701)    Endplates were prepared. Curettes and osteotomes were used to perform bony anterior release and osteotomize any anterior column ossification. We applied some  distraction across the pedicle screws. We sized for a cage. Allograft and local bone complex was impacted into the lateral disc space, and the cage was impacted as well with nerve roots safely out of harm's way. This cage was separate and distinct from any additional instrumentation and an integral part of the fusion procedure. (APPLICATION OF INTERBODY DEVICE CPT CODE 22641)    Pre-operative measurements documenting a mismatch between pelvic incidence and lumbar lordosis had been made, thus documenting clinically significant kyphosis. The sagittal imbalance greater than 20 degrees was reconfirmed under flourscopic guidance with a substantial portion of the kyphosis occurring locally. Appropriate realignment was not achievable using the above conventional means. For those reasons we proceeded with a posterior element osteotomy. We extended our release of the posterior elements to additional posterior lements including but not limited to a wide resection of facets, ligamentum flavum and intraspinous ligaments. Additional confirmation of disc space release was also confirmed to facilitate correction. We were able to later confirm correction of ten degrees or more.  (POSTERIOR ELEMENT OSTEOTOMY  LEVEL CPT CODE 03429)    Our attention then turned to the adjacent lumbar 3/4 level(s) where we repeated the same steps. The facet was osteotomized and resected. A bur was used to remove additional bone. Pars was resected as well. We identified the lateral ligamentum flavum and resected that with a Buckley ball in place over the dura and a 4 and 5- mm Kerrison. The area was further decompressed from cranial to caudal pedicle using bur, Kerrison rongeurs, and other instruments. Neural elements were not harmed during this process.     A bur was used to remove additional bone. We identified the lateral ligamentum flavum and resected that with a Buckley ball in place over the dura and a 4 and 5-mm Kerrison. Contralateral  decompression was undertaken thus creating a bilateral decompression and bilateral microforaminotomy and hemilaminotomy using undercutting technique. With undercutting technique and a Buckley ball in place, we excised ligamentum flavum through the bone at the cranial foramina as needed, facilitating a serial cranial decompression and undercutting microforaminotomy and laminectomy. This was done in an undercutting fashion as well and done to examine the central and contralateral neural elements facilitating central bilateral decompression as well. The traversing nerve root was identified and thoroughly decompressed. An extraforaminal and transfacet/transpedicular decompression was utilized to perform a thorough decompression of the exiting nerve root and remove redundant annulus and any herniated nucleus pulposus.   (CENTRAL DECOMPRESSION ADDITIONAL LEVEL CPT 73393)        With a retractor in place we localized the far lateral and transforaminal region of the level. We examined areas more farther laterally to facilitate a transforaminal decompression and discectomy. Nerve root retractor was applied. We were able to retract the traversing nerve root over. Bipolar eletrocautery was used for hemostasis. Annulotomy was performed. Further lateral structures and paraspinal muscles were dissected away from the area of the transverse process and further anteriorly. Curettes, germán, and pituitary rongeurs were used to remove the rest of the remaining disc in order to perform a thorough transforaminal lumbar discectomy. Additionally, posterior elements including the facets, lateral pars and other structures on the contralateral side were decorticated using a high speed elroy and curretes. Local bone was applied to complete a posterolateral fusion. (TRANSFORAMINAL AND POSTEROLATERAL LUMBAR INTERBODY FUSION ADDITIONAL LEVEL CPT CODE 75061)      Pre-operative measurements documenting a mismatch between pelvic incidence and lumbar  lordosis had been made, thus documenting clinically significant kyphosis. The sagittal imbalance greater than 20 degrees was reconfirmed under flourscopic guidance with a substantial portion of the kyphosis occurring locally. Appropriate realignment was not achievable using the above conventional means. For those reasons we proceeded with a posterior element osteotomy. We extended our release of the posterior elements to additional posterior lements including but not limited to a wide resection of facets, ligamentum flavum and intraspinous ligaments. Additional confirmation of disc space release was also confirmed to facilitate correction. We were able to later confirm correction of ten degrees or more.  (POSTERIOR ELEMENT OSTEOTOMY ADDITIONAL LEVEL CPT CODE 49707)        Hemostasis was obtained. Duragen was placed over the neural elements. Tulip heads were placed on the contralateral side. Then, with dilators in place, the screws were placed. Instrumentation was placed on the right over K-wires in standard technique and fluoroscopy.     Screws were further advanced under fluoroscopy. All screws were tested using real-time neuromonitoring. Thresholds were above acceptable values. Rods and top tighteners were applied bilaterally, and final tightening was applied with compression devices to make use of the osteotomy to confirm greater than ten degrees of improvement where appropriate, thereby reducing kyphosis and pelvic incidence and lordosis mismatch. X-rays confirmed appropriate localization of our instrumentation.      (POSTERIOR LUMBAR INSTRUMENTATION MULTIPLE INTERSPACES-CPT 33187)        Valsalva maneuver confirmed that there was no spinal leak. The wound was thoroughly irrigated. Hemostasis was maintained. The fascia was reapproximated bilaterally using 1-0 Vicryl. A 2-0 Vicryl was used for subcutaneous tissues. A running 3-0 subcuticular stitch was applied. Steri-Strips were applied. Sterile dressings were  applied.       Condition: The patient was extubated and taken to the recovery room in stable condition and was  neurologically intact on arrival and had improvement of leg pain. SSEPs remained at their  baseline. Needle and Ray-Wilver counts were correct at the conclusion of the case.      WINDY Acevedo MD

## 2025-04-25 ENCOUNTER — TELEPHONE (OUTPATIENT)
Dept: SURGERY | Facility: CLINIC | Age: 65
End: 2025-04-25

## 2025-04-25 NOTE — TELEPHONE ENCOUNTER
Called and spoke w/ patient.   S/P L3-L5 Decompression Interbody Fusion 4/22/25  Patient reports he feels great.  Denies fever, night sweats or chills.   Denies any new numbness, tingling or weakness in lower extremities/upper extremities.   Denies loss of balance.   Denies loss of bowel/bladder. Denies saddle anesthesia.   Denies HA  BM - no   Patient states incision is clean and dry. Denies drainage, redness, warmth, foul odor.     Currently taking post op med    Patient states that they are up walking. Encouraged patient be up and moving every 45 minutes to 1 hour, increase walking as tolerated.   Patient encouraged to increase fluids and fiber intake. Continue spine precautions.  Discussed normal post op pain    Encouraged patient to call with any questions or concerns.    Has post op follow up scheduled:     Future Appointments   Date Time Provider Department Center   5/8/2025 12:00 PM [id number]

## 2025-04-26 ENCOUNTER — HOSPITAL ENCOUNTER (INPATIENT)
Facility: HOSPITAL | Age: 65
LOS: 2 days | Discharge: HOME | DRG: 381 | End: 2025-04-28
Attending: EMERGENCY MEDICINE | Admitting: STUDENT IN AN ORGANIZED HEALTH CARE EDUCATION/TRAINING PROGRAM
Payer: MEDICARE

## 2025-04-26 ENCOUNTER — APPOINTMENT (OUTPATIENT)
Dept: CT IMAGING | Facility: HOSPITAL | Age: 65
DRG: 381 | End: 2025-04-26
Attending: EMERGENCY MEDICINE
Payer: MEDICARE

## 2025-04-26 DIAGNOSIS — K56.0 ADYNAMIC ILEUS (HCC): Primary | ICD-10-CM

## 2025-04-26 DIAGNOSIS — R11.2 NAUSEA AND VOMITING IN ADULT: ICD-10-CM

## 2025-04-26 DIAGNOSIS — R73.9 HYPERGLYCEMIA: ICD-10-CM

## 2025-04-26 LAB
ALBUMIN SERPL-MCNC: 4.7 G/DL (ref 3.2–4.8)
ALBUMIN/GLOB SERPL: 1.8 {RATIO} (ref 1–2)
ALP LIVER SERPL-CCNC: 59 U/L (ref 45–117)
ALT SERPL-CCNC: 15 U/L (ref 10–49)
ANION GAP SERPL CALC-SCNC: 11 MMOL/L (ref 0–18)
APTT PPP: 21.8 SECONDS (ref 23–36)
AST SERPL-CCNC: 21 U/L (ref ?–34)
BASOPHILS # BLD AUTO: 0.02 X10(3) UL (ref 0–0.2)
BASOPHILS NFR BLD AUTO: 0.2 %
BILIRUB SERPL-MCNC: 0.9 MG/DL (ref 0.2–1.1)
BUN BLD-MCNC: 31 MG/DL (ref 9–23)
CALCIUM BLD-MCNC: 10.1 MG/DL (ref 8.7–10.6)
CHLORIDE SERPL-SCNC: 91 MMOL/L (ref 98–112)
CO2 SERPL-SCNC: 32 MMOL/L (ref 21–32)
CREAT BLD-MCNC: 1.17 MG/DL (ref 0.7–1.3)
D DIMER PPP FEU-MCNC: 1.48 UG/ML FEU (ref ?–0.65)
EGFRCR SERPLBLD CKD-EPI 2021: 69 ML/MIN/1.73M2 (ref 60–?)
EOSINOPHIL # BLD AUTO: 0 X10(3) UL (ref 0–0.7)
EOSINOPHIL NFR BLD AUTO: 0 %
ERYTHROCYTE [DISTWIDTH] IN BLOOD BY AUTOMATED COUNT: 12.9 %
GLOBULIN PLAS-MCNC: 2.6 G/DL (ref 2–3.5)
GLUCOSE BLD-MCNC: 181 MG/DL (ref 70–99)
HCT VFR BLD AUTO: 38 % (ref 39–53)
HGB BLD-MCNC: 13.3 G/DL (ref 13–17.5)
IMM GRANULOCYTES # BLD AUTO: 0.04 X10(3) UL (ref 0–1)
IMM GRANULOCYTES NFR BLD: 0.3 %
INR BLD: 0.93 (ref 0.8–1.2)
LIPASE SERPL-CCNC: 22 U/L (ref 12–53)
LYMPHOCYTES # BLD AUTO: 1.06 X10(3) UL (ref 1–4)
LYMPHOCYTES NFR BLD AUTO: 8.6 %
MCH RBC QN AUTO: 31.8 PG (ref 26–34)
MCHC RBC AUTO-ENTMCNC: 35 G/DL (ref 31–37)
MCV RBC AUTO: 90.9 FL (ref 80–100)
MONOCYTES # BLD AUTO: 0.97 X10(3) UL (ref 0.1–1)
MONOCYTES NFR BLD AUTO: 7.9 %
NEUTROPHILS # BLD AUTO: 10.23 X10 (3) UL (ref 1.5–7.7)
NEUTROPHILS # BLD AUTO: 10.23 X10(3) UL (ref 1.5–7.7)
NEUTROPHILS NFR BLD AUTO: 83 %
OSMOLALITY SERPL CALC.SUM OF ELEC: 289 MOSM/KG (ref 275–295)
PLATELET # BLD AUTO: 328 10(3)UL (ref 150–450)
POTASSIUM SERPL-SCNC: 3.8 MMOL/L (ref 3.5–5.1)
PROT SERPL-MCNC: 7.3 G/DL (ref 5.7–8.2)
PROTHROMBIN TIME: 12.6 SECONDS (ref 11.6–14.8)
RBC # BLD AUTO: 4.18 X10(6)UL (ref 3.8–5.8)
SODIUM SERPL-SCNC: 134 MMOL/L (ref 136–145)
WBC # BLD AUTO: 12.3 X10(3) UL (ref 4–11)

## 2025-04-26 PROCEDURE — 80053 COMPREHEN METABOLIC PANEL: CPT | Performed by: EMERGENCY MEDICINE

## 2025-04-26 PROCEDURE — 85610 PROTHROMBIN TIME: CPT | Performed by: EMERGENCY MEDICINE

## 2025-04-26 PROCEDURE — 85025 COMPLETE CBC W/AUTO DIFF WBC: CPT | Performed by: EMERGENCY MEDICINE

## 2025-04-26 PROCEDURE — 96375 TX/PRO/DX INJ NEW DRUG ADDON: CPT

## 2025-04-26 PROCEDURE — 96361 HYDRATE IV INFUSION ADD-ON: CPT

## 2025-04-26 PROCEDURE — 96374 THER/PROPH/DIAG INJ IV PUSH: CPT

## 2025-04-26 PROCEDURE — 99285 EMERGENCY DEPT VISIT HI MDM: CPT

## 2025-04-26 PROCEDURE — 83690 ASSAY OF LIPASE: CPT | Performed by: EMERGENCY MEDICINE

## 2025-04-26 PROCEDURE — 71275 CT ANGIOGRAPHY CHEST: CPT | Performed by: EMERGENCY MEDICINE

## 2025-04-26 PROCEDURE — 74177 CT ABD & PELVIS W/CONTRAST: CPT | Performed by: EMERGENCY MEDICINE

## 2025-04-26 PROCEDURE — 85379 FIBRIN DEGRADATION QUANT: CPT | Performed by: EMERGENCY MEDICINE

## 2025-04-26 PROCEDURE — 85730 THROMBOPLASTIN TIME PARTIAL: CPT | Performed by: EMERGENCY MEDICINE

## 2025-04-26 RX ORDER — HYDROCODONE BITARTRATE AND ACETAMINOPHEN 10; 325 MG/1; MG/1
1 TABLET ORAL EVERY 4 HOURS PRN
Status: DISCONTINUED | OUTPATIENT
Start: 2025-04-26 | End: 2025-04-28

## 2025-04-26 RX ORDER — ONDANSETRON 4 MG/1
4 TABLET, FILM COATED ORAL EVERY 8 HOURS PRN
Status: DISCONTINUED | OUTPATIENT
Start: 2025-04-26 | End: 2025-04-28

## 2025-04-26 RX ORDER — HYDROCODONE BITARTRATE AND ACETAMINOPHEN 10; 325 MG/1; MG/1
1 TABLET ORAL EVERY 4 HOURS PRN
COMMUNITY
Start: 2025-04-14

## 2025-04-26 RX ORDER — METHYLPREDNISOLONE 4 MG/1
4 TABLET ORAL AS DIRECTED
COMMUNITY
Start: 2025-04-24 | End: 2025-04-28

## 2025-04-26 RX ORDER — SODIUM CHLORIDE, SODIUM LACTATE, POTASSIUM CHLORIDE, CALCIUM CHLORIDE 600; 310; 30; 20 MG/100ML; MG/100ML; MG/100ML; MG/100ML
INJECTION, SOLUTION INTRAVENOUS CONTINUOUS
Status: DISCONTINUED | OUTPATIENT
Start: 2025-04-26 | End: 2025-04-27

## 2025-04-26 RX ORDER — METHOCARBAMOL 500 MG/1
500 TABLET, FILM COATED ORAL AS NEEDED
COMMUNITY
Start: 2025-04-14

## 2025-04-26 RX ORDER — ONDANSETRON 2 MG/ML
4 INJECTION INTRAMUSCULAR; INTRAVENOUS ONCE
Status: COMPLETED | OUTPATIENT
Start: 2025-04-26 | End: 2025-04-26

## 2025-04-26 RX ORDER — METHOCARBAMOL 500 MG/1
500 TABLET, FILM COATED ORAL AS NEEDED
Status: DISCONTINUED | OUTPATIENT
Start: 2025-04-26 | End: 2025-04-28

## 2025-04-26 RX ORDER — ROSUVASTATIN CALCIUM 5 MG/1
5 TABLET, COATED ORAL DAILY
Status: DISCONTINUED | OUTPATIENT
Start: 2025-04-26 | End: 2025-04-28

## 2025-04-26 NOTE — ED INITIAL ASSESSMENT (HPI)
Pt to ER via wheelchair, 86% RA, placed on NC in triage.  Spinal surgery on Tuesday and prescribed pain meds and given steroid for sciatic nerve pain, after taking, started vomiting and has been unable to take pain meds or keep fluids down. Pt added feels SOB and mild dizziness since stopping steroid.

## 2025-04-26 NOTE — ED PROVIDER NOTES
Patient Seen in: [facility] Emergency Department      History     Chief Complaint   Patient presents with    GI Bleeding     Stated Complaint: GI  bleed    Subjective:   82-year-old male, presents with coffee-ground emesis.  Patient had lumbar surgery done earlier this week.  Was put on steroids for some radiculopathy of the left leg.  On a Medrol Dosepak and started vomiting yesterday, coffee ground.  Has no abdominal pain.  History of ulcerative colitis.  Does not chronic ventral hernia as well.  Also feels a bit short of breath states not been very mobile in the past several months secondary to his back issues.          History of Present Illness               Objective:     Past Medical History:    Essential hypertension    Hearing loss    DREW (obstructive sleep apnea)    AHI 8 RDI 16 REM AHI 12 SaO2 murali 81 %    Prostate cancer (HCC)    Ulcerative colitis (HCC)              Past Surgical History:   Procedure Laterality Date    Colectomy  1999                Social History     Socioeconomic History    Marital status:    Tobacco Use    Smoking status: Former    Smokeless tobacco: Never   Substance and Sexual Activity    Alcohol use: No     Alcohol/week: 0.0 standard drinks of alcohol    Drug use: No     Social Drivers of Health     Food Insecurity: No Food Insecurity (4/23/2025)    Received from [facility] - Food Insecurity     Worried About Running Out of Food in the Last Year: No     Ran Out of Food in the Last Year: No   Transportation Needs: No Transportation Needs (4/23/2025)    Received from [facility] - Transportation     Lack of Transportation: No   Housing Stability: Not At Risk (4/23/2025)    Received from [facility] - Housing/Utilities     Has Housing: Yes     Worried About Losing Housing: No     Unable to Get Utilities: No                                Physical Exam     ED Triage Vitals   BP  04/26/25 1756 (!) 120/93   Pulse 04/26/25 1756 108   Resp 04/26/25 1756 20   Temp 04/26/25 1743 98.5 °F (36.9 °C)   Temp src 04/26/25 1743 Temporal   SpO2 04/26/25 1743 (!) 86 %   O2 Device 04/26/25 1743 Nasal cannula       Current Vitals:   Vital Signs  BP: 135/87  Pulse: 88  Resp: 19  Temp: 98.5 °F (36.9 °C)  Temp src: Temporal  MAP (mmHg): (!) 102    Oxygen Therapy  SpO2: 97 %  O2 Device: None (Room air)        Physical Exam  Vitals and nursing note reviewed.   Constitutional:       General: He is not in acute distress.     Appearance: Normal appearance. He is not ill-appearing, toxic-appearing or diaphoretic.   HENT:      Head: Normocephalic.   Cardiovascular:      Rate and Rhythm: Normal rate.      Heart sounds: Normal heart sounds.   Pulmonary:      Effort: Pulmonary effort is normal. No respiratory distress.      Breath sounds: Normal breath sounds.   Abdominal:      General: There is no distension.      Palpations: Abdomen is soft.      Tenderness: There is no abdominal tenderness. There is no guarding or rebound.      Comments: Soft, nontender nondistended.  Large right ventral hernia which is chronic   Musculoskeletal:         General: Normal range of motion.      Cervical back: Neck supple.   Skin:     General: Skin is warm and dry.   Neurological:      Mental Status: He is alert.   Psychiatric:         Mood and Affect: Mood normal.         Behavior: Behavior normal.           Physical Exam                ED Course     Labs Reviewed   COMP METABOLIC PANEL (14) - Abnormal; Notable for the following components:       Result Value    Glucose 181 (*)     Sodium 134 (*)     Chloride 91 (*)     BUN 31 (*)     All other components within normal limits   CBC WITH DIFFERENTIAL WITH PLATELET - Abnormal; Notable for the following components:    WBC 12.3 (*)     HCT 38.0 (*)     Neutrophil Absolute Prelim 10.23 (*)     Neutrophil Absolute 10.23 (*)     All other components within normal limits   PTT, ACTIVATED -  Abnormal; Notable for the following components:    PTT 21.8 (*)     All other components within normal limits   D-DIMER - Abnormal; Notable for the following components:    D-Dimer 1.48 (*)     All other components within normal limits   LIPASE - Normal   PROTHROMBIN TIME (PT) - Normal   RAINBOW DRAW LAVENDER   RAINBOW DRAW LIGHT GREEN   RAINBOW DRAW BLUE   RAINBOW DRAW GOLD          Results                                 MDM      CTA CHEST + CT ABD (W) + CT PEL (W) (CPT=71275/73969)  Result Date: 4/26/2025  CONCLUSION:   1. Several fluid-filled loops of bowel as well as gaseous distension of the colon most consistent with an adynamic ileus rather than bowel obstruction.  2. No evidence of pulmonary embolus or acute cardiopulmonary process.  3. Fluid distension of the esophagus due to patient vomiting.  The stomach is moderately distended with fluid.   LOCATION:  [facility]   Dictated by (CST): Raul Liang MD on 4/26/2025 at 8:24 PM     Finalized by (CST): Raul Liang MD on 4/26/2025 at 8:31 PM         I independent interpreted the CT abdomen pelvis and note the ileus with the distended bowel, no obvious transition point    External chart review demonstrates his recent visits with duly regarding his surgery    Family at bedside helpful to provide information on history presenting illness    Differential diagnosis includes, but not limited to, ileus, bowel obstruction, hematemesis    64-year-old male with abdominal distention with nausea vomiting mild pain.  Also some shortness of breath.  CT of the chest is stable.  CT abdomen pelvis without any obvious obstruction but adynamic ileus.  Not vomiting here.  Tolerating p.o. but still weak some hiccups and feeling nauseous.  Visit him and family.  Initially want to go home but after scheduled family reluctant to stay.  With his findings and his episode of coffee-ground gnosis at home we will keep further workup evaluation.  H&H stable vital signs are stable.  Admitted  to [facility] hospital[facility], awaiting bed assignment      Admission disposition: 4/26/2025 10:17 PM           Medical Decision Making      Disposition and Plan     Clinical Impression:  1. Adynamic ileus (HCC)    2. Hyperglycemia    3. Nausea and vomiting in adult         Disposition:  Admit  4/26/2025 10:17 pm    Follow-up:  No follow-up provider specified.        Medications Prescribed:  Current Discharge Medication List          Supplementary Documentation:         Hospital Problems       Present on Admission  Date Reviewed: 4/1/2025          ICD-10-CM Noted POA    * (Principal) Adynamic ileus (HCC) K56.0 4/26/2025 Unknown

## 2025-04-27 ENCOUNTER — ANESTHESIA (OUTPATIENT)
Dept: ENDOSCOPY | Facility: HOSPITAL | Age: 65
End: 2025-04-27
Payer: MEDICARE

## 2025-04-27 ENCOUNTER — ANESTHESIA EVENT (OUTPATIENT)
Dept: ENDOSCOPY | Facility: HOSPITAL | Age: 65
End: 2025-04-27
Payer: MEDICARE

## 2025-04-27 LAB
ALBUMIN SERPL-MCNC: 3.5 G/DL (ref 3.2–4.8)
ALBUMIN/GLOB SERPL: 1.8 {RATIO} (ref 1–2)
ALP LIVER SERPL-CCNC: 45 U/L (ref 45–117)
ALT SERPL-CCNC: 12 U/L (ref 10–49)
ANION GAP SERPL CALC-SCNC: 7 MMOL/L (ref 0–18)
AST SERPL-CCNC: 19 U/L (ref ?–34)
BASOPHILS # BLD AUTO: 0.02 X10(3) UL (ref 0–0.2)
BASOPHILS NFR BLD AUTO: 0.2 %
BILIRUB SERPL-MCNC: 0.8 MG/DL (ref 0.2–1.1)
BUN BLD-MCNC: 22 MG/DL (ref 9–23)
CALCIUM BLD-MCNC: 8.9 MG/DL (ref 8.7–10.6)
CHLORIDE SERPL-SCNC: 99 MMOL/L (ref 98–112)
CO2 SERPL-SCNC: 30 MMOL/L (ref 21–32)
CREAT BLD-MCNC: 0.91 MG/DL (ref 0.7–1.3)
EGFRCR SERPLBLD CKD-EPI 2021: 94 ML/MIN/1.73M2 (ref 60–?)
EOSINOPHIL # BLD AUTO: 0.07 X10(3) UL (ref 0–0.7)
EOSINOPHIL NFR BLD AUTO: 0.8 %
ERYTHROCYTE [DISTWIDTH] IN BLOOD BY AUTOMATED COUNT: 12.9 %
GLOBULIN PLAS-MCNC: 1.9 G/DL (ref 2–3.5)
GLUCOSE BLD-MCNC: 122 MG/DL (ref 70–99)
HCT VFR BLD AUTO: 27.8 % (ref 39–53)
HGB BLD-MCNC: 9.9 G/DL (ref 13–17.5)
HGB BLD-MCNC: 9.9 G/DL (ref 13–17.5)
IMM GRANULOCYTES # BLD AUTO: 0.04 X10(3) UL (ref 0–1)
IMM GRANULOCYTES NFR BLD: 0.4 %
LYMPHOCYTES # BLD AUTO: 1.5 X10(3) UL (ref 1–4)
LYMPHOCYTES NFR BLD AUTO: 16.6 %
MAGNESIUM SERPL-MCNC: 2.2 MG/DL (ref 1.6–2.6)
MCH RBC QN AUTO: 31.8 PG (ref 26–34)
MCHC RBC AUTO-ENTMCNC: 35.6 G/DL (ref 31–37)
MCV RBC AUTO: 89.4 FL (ref 80–100)
MONOCYTES # BLD AUTO: 0.88 X10(3) UL (ref 0.1–1)
MONOCYTES NFR BLD AUTO: 9.7 %
NEUTROPHILS # BLD AUTO: 6.54 X10 (3) UL (ref 1.5–7.7)
NEUTROPHILS # BLD AUTO: 6.54 X10(3) UL (ref 1.5–7.7)
NEUTROPHILS NFR BLD AUTO: 72.3 %
OSMOLALITY SERPL CALC.SUM OF ELEC: 287 MOSM/KG (ref 275–295)
PLATELET # BLD AUTO: 233 10(3)UL (ref 150–450)
POTASSIUM SERPL-SCNC: 3.4 MMOL/L (ref 3.5–5.1)
PROT SERPL-MCNC: 5.4 G/DL (ref 5.7–8.2)
RBC # BLD AUTO: 3.11 X10(6)UL (ref 3.8–5.8)
SODIUM SERPL-SCNC: 136 MMOL/L (ref 136–145)
WBC # BLD AUTO: 9.1 X10(3) UL (ref 4–11)

## 2025-04-27 PROCEDURE — 0DJ08ZZ INSPECTION OF UPPER INTESTINAL TRACT, VIA NATURAL OR ARTIFICIAL OPENING ENDOSCOPIC: ICD-10-PCS | Performed by: INTERNAL MEDICINE

## 2025-04-27 PROCEDURE — 85018 HEMOGLOBIN: CPT | Performed by: HOSPITALIST

## 2025-04-27 PROCEDURE — 83735 ASSAY OF MAGNESIUM: CPT | Performed by: STUDENT IN AN ORGANIZED HEALTH CARE EDUCATION/TRAINING PROGRAM

## 2025-04-27 PROCEDURE — 85025 COMPLETE CBC W/AUTO DIFF WBC: CPT | Performed by: STUDENT IN AN ORGANIZED HEALTH CARE EDUCATION/TRAINING PROGRAM

## 2025-04-27 PROCEDURE — 80053 COMPREHEN METABOLIC PANEL: CPT | Performed by: STUDENT IN AN ORGANIZED HEALTH CARE EDUCATION/TRAINING PROGRAM

## 2025-04-27 RX ORDER — PANTOPRAZOLE SODIUM 40 MG/1
40 TABLET, DELAYED RELEASE ORAL
Status: DISCONTINUED | OUTPATIENT
Start: 2025-04-28 | End: 2025-04-27

## 2025-04-27 RX ORDER — SENNOSIDES 8.6 MG
8.6 TABLET ORAL 2 TIMES DAILY
Status: DISCONTINUED | OUTPATIENT
Start: 2025-04-27 | End: 2025-04-28

## 2025-04-27 RX ORDER — PANTOPRAZOLE SODIUM 40 MG/1
40 TABLET, DELAYED RELEASE ORAL
Status: DISCONTINUED | OUTPATIENT
Start: 2025-04-27 | End: 2025-04-28

## 2025-04-27 RX ORDER — POLYETHYLENE GLYCOL 3350 17 G/17G
17 POWDER, FOR SOLUTION ORAL DAILY
Status: DISCONTINUED | OUTPATIENT
Start: 2025-04-27 | End: 2025-04-28

## 2025-04-27 RX ORDER — PANTOPRAZOLE SODIUM 40 MG/1
40 TABLET, DELAYED RELEASE ORAL
Qty: 60 TABLET | Refills: 1 | Status: SHIPPED | OUTPATIENT
Start: 2025-04-27

## 2025-04-27 RX ORDER — DOCUSATE SODIUM 100 MG/1
100 CAPSULE, LIQUID FILLED ORAL 2 TIMES DAILY
Status: DISCONTINUED | OUTPATIENT
Start: 2025-04-27 | End: 2025-04-28

## 2025-04-27 RX ORDER — SODIUM CHLORIDE, SODIUM LACTATE, POTASSIUM CHLORIDE, CALCIUM CHLORIDE 600; 310; 30; 20 MG/100ML; MG/100ML; MG/100ML; MG/100ML
INJECTION, SOLUTION INTRAVENOUS CONTINUOUS
Status: DISCONTINUED | OUTPATIENT
Start: 2025-04-27 | End: 2025-04-28

## 2025-04-27 RX ORDER — POTASSIUM CHLORIDE 1500 MG/1
40 TABLET, EXTENDED RELEASE ORAL ONCE
Status: COMPLETED | OUTPATIENT
Start: 2025-04-27 | End: 2025-04-27

## 2025-04-27 RX ORDER — LIDOCAINE HYDROCHLORIDE 10 MG/ML
INJECTION, SOLUTION EPIDURAL; INFILTRATION; INTRACAUDAL; PERINEURAL AS NEEDED
Status: DISCONTINUED | OUTPATIENT
Start: 2025-04-27 | End: 2025-04-27 | Stop reason: SURG

## 2025-04-27 RX ORDER — NALOXONE HYDROCHLORIDE 0.4 MG/ML
0.08 INJECTION, SOLUTION INTRAMUSCULAR; INTRAVENOUS; SUBCUTANEOUS ONCE AS NEEDED
Status: DISCONTINUED | OUTPATIENT
Start: 2025-04-27 | End: 2025-04-27 | Stop reason: HOSPADM

## 2025-04-27 RX ADMIN — LIDOCAINE HYDROCHLORIDE 100 MG: 10 INJECTION, SOLUTION EPIDURAL; INFILTRATION; INTRACAUDAL; PERINEURAL at 12:58:00

## 2025-04-27 NOTE — CONSULTS
[facility]    Efraín Dykes Patient Status:  Inpatient    1960 MRN [id number]   Location [facility] 4NW-A Attending Bryce Grey MD   Hosp Day # 1 PCP Ronnie Turner MD       Efraín Dykes is a 64 year old male for coffee ground emesis anemia consult.  Emesis x 2 days.  Had lumbar spine surgery last week.  Patient denies rectal bleeding, abd pain, diarrhea, constipation, melena. Hx UC with colectomy Hartmans pouch 25 years ago. No ASA NSAIDs    Lab Results   Component Value Date    WBC 9.1 2025    HGB 9.9 2025    HCT 27.8 2025    .0 2025    CREATSERUM 0.91 2025    BUN 22 2025     2025    K 3.4 2025    CL 99 2025    CO2 30.0 2025     2025    CA 8.9 2025    ALB 3.5 2025    ALKPHO 45 2025    BILT 0.8 2025    TP 5.4 2025    AST 19 2025    ALT 12 2025    PTT 21.8 2025    INR 0.93 2025    LIP 22 2025    DDIMER 1.48 2025    MG 2.2 2025       Chief Complaint   Patient presents with    GI Bleeding        Allergies: Allergies[1]   Current Medications[2]   Past Medical History[3]   Past Surgical History[4]   Short Social Hx on File[5]  Occupation:    FAMILY HX: GI HX: None, no hx of colon cancer  Family History[6]     REVIEW OF SYSTEMS:   GEN: feels well otherwise, no F/C, no wt loss  SKIN: no skin lesions  HEENT: no jaundice   LUNGS: no SOB   CV: no chest pain GI: no dysphagia, N/V  : no hematuria    MSK: no joint pain ENDOCR: no diabetes or thyroid history  EXAM:   /61 (BP Location: Left arm)   Pulse 61   Temp 98.2 °F (36.8 °C) (Oral)   Resp 16   Wt 157 lb 3 oz (71.3 kg)   SpO2 95%   BMI 21.92 kg/m²  - Body mass index is 21.92 kg/m².  GEN: well developed, well nourished, NAD  HEENT: non-icteric   LUNGS: CTA  CARDIO: RRR  GI: good BS's,no masses, HSM or tenderness RECTAL: Exam not done. EXTREM.: no edema NEURO: A + O    ASSESSMENT AND  PLAN:   Efraín Dykes is a 64 year old male with coffee ground emesis.  Lumbar surgery last week.  Has emesis x 2 days no melena rectal bleeding. Hb rop.  Hx UC colectomy Hartmans pouch 25 years ago  No ASA NSAIDs  Patient is otherwise asymptomatic.  Need to rule out PUD, gastritis or other with endoscopic evaluation.  Needs EGD, procedure explained, risks of bleeding, perforation discussed.    1. PPI  2. NPO  3. EGD today    Total time spent on patient care:  40 minutes    cc: Dr. Jarquin ref. provider found    The patient indicates understanding of these issues and agrees to the plan.    Derick Magdaleno MD  4/27/2025  10:59 AM         [1] No Known Allergies  [2]   No current outpatient medications on file.   [3]   Past Medical History:   Essential hypertension    Hearing loss    DREW (obstructive sleep apnea)    AHI 8 RDI 16 REM AHI 12 SaO2 murali 81 %    Prostate cancer (HCC)    Ulcerative colitis (HCC)   [4]   Past Surgical History:  Procedure Laterality Date    Colectomy  1999   [5]   Social History  Socioeconomic History    Marital status:    Tobacco Use    Smoking status: Former    Smokeless tobacco: Never   Substance and Sexual Activity    Alcohol use: No     Alcohol/week: 0.0 standard drinks of alcohol    Drug use: No     Social Drivers of Health     Food Insecurity: No Food Insecurity (4/27/2025)    NCSS - Food Insecurity     Worried About Running Out of Food in the Last Year: No     Ran Out of Food in the Last Year: No   Transportation Needs: No Transportation Needs (4/27/2025)    NCSS - Transportation     Lack of Transportation: No   Housing Stability: Not At Risk (4/27/2025)    NCSS - Housing/Utilities     Has Housing: Yes     Worried About Losing Housing: No     Unable to Get Utilities: No   [6] History reviewed. No pertinent family history.

## 2025-04-27 NOTE — PLAN OF CARE
NURSING ADMISSION NOTE      Patient admitted via Cart  Oriented to room.  Safety precautions initiated.  Bed in low position.  Call light in reach.    Patient admitted to Med/Onc floor for Adynamic ileus. Patient is AOx4, RA, VSS, Clear liquid diet, Telemetry monitoring. Ambulating with 1 assist. Wound dressing noted to lower medial back due to recent lumbar surgery. Patient wears a lower back brace for walking; brace at bedside.Patient denies pain. No reports of nausea or vomiting. CT of chest and abdomen showed several fluid filled loops of bowel, adynamic ileus but no bowel obstruction. Fall precautions in place with bed at lowest position. Call light within reach.    Addendum (late entry 4/27/25 at 1421)): Patient reports constipation since 4/21. Patient stated \" I was going to try laxatives for constipation but I could not keep any medications down due to throwing up so many times\"

## 2025-04-27 NOTE — ANESTHESIA PREPROCEDURE EVALUATION
PRE-OP EVALUATION    Patient Name: Efraín Dykes    Admit Diagnosis: Adynamic ileus (HCC) [K56.0]  Hyperglycemia [R73.9]  Nausea and vomiting in adult [R11.2]    Pre-op Diagnosis: Abdominal pain [R10.9]    ESOPHAGOGASTRODUODENOSCOPY (EGD)    Anesthesia Procedure: ESOPHAGOGASTRODUODENOSCOPY (EGD)    Surgeons and Role:     * Derick Magdaleno MD - Primary    Pre-op vitals reviewed.  Temp: 98.9 °F (37.2 °C)  Pulse: 75  Resp: 16  BP: 119/70  SpO2: 93 %  Body mass index is 21.92 kg/m².    Current medications reviewed.  Hospital Medications:  Current Medications[1]    Outpatient Medications:   Prescriptions Prior to Admission[2]    Allergies: Patient has no known allergies.      Anesthesia Evaluation    Patient summary reviewed.    Anesthetic Complications           GI/Hepatic/Renal                                 Cardiovascular                  (+) hypertension                                     Endo/Other                                  Pulmonary                    (+) sleep apnea       Neuro/Psych                                      Past Surgical History[3]  Social Hx on file[4]  History   Drug Use No     Available pre-op labs reviewed.  Lab Results   Component Value Date    WBC 9.1 04/27/2025    RBC 3.11 (L) 04/27/2025    HGB 9.9 (L) 04/27/2025    HCT 27.8 (L) 04/27/2025    MCV 89.4 04/27/2025    MCH 31.8 04/27/2025    MCHC 35.6 04/27/2025    RDW 12.9 04/27/2025    .0 04/27/2025     Lab Results   Component Value Date     04/27/2025    K 3.4 (L) 04/27/2025    CL 99 04/27/2025    CO2 30.0 04/27/2025    BUN 22 04/27/2025    CREATSERUM 0.91 04/27/2025     (H) 04/27/2025    CA 8.9 04/27/2025     Lab Results   Component Value Date    INR 0.93 04/26/2025         Airway      Mallampati: II  Mouth opening: >3 FB  TM distance: > 6 cm  Neck ROM: full Cardiovascular    Cardiovascular exam normal.         Dental             Pulmonary    Pulmonary exam normal.                 Other findings              ASA: 2    Plan: MAC  NPO status verified and           Plan/risks discussed with: patient                Present on Admission:  **None**             [1]    docusate sodium (Colace) cap 100 mg  100 mg Oral BID    sennosides (Senokot) tab 8.6 mg  8.6 mg Oral BID    polyethylene glycol (PEG 3350) (Miralax) 17 g oral packet 17 g  17 g Oral Daily    [COMPLETED] potassium chloride (Klor-Con M20) tab 40 mEq  40 mEq Oral Once    [COMPLETED] sodium chloride 0.9 % IV bolus 1,000 mL  1,000 mL Intravenous Once    [COMPLETED] pantoprazole (Protonix) 40 mg in sodium chloride 0.9% PF 10 mL IV push  40 mg Intravenous Once    [COMPLETED] ondansetron (Zofran) 4 MG/2ML injection 4 mg  4 mg Intravenous Once    [COMPLETED] iopamidol 76% (ISOVUE-370) injection for power injector  100 mL Intravenous ONCE PRN    rosuvastatin (Crestor) tab 5 mg  5 mg Oral Daily    HYDROcodone-acetaminophen (Norco)  MG per tab 1 tablet  1 tablet Oral Q4H PRN    methocarbamol (Robaxin) tab 500 mg  500 mg Oral PRN    pantoprazole (Protonix) 40 mg in sodium chloride 0.9% PF 10 mL IV push  40 mg Intravenous Q12H    melatonin tab 3 mg  3 mg Oral Nightly PRN    ondansetron (Zofran) tab 4 mg  4 mg Oral Q8H PRN   [2]   Medications Prior to Admission   Medication Sig Dispense Refill Last Dose/Taking    HYDROcodone-acetaminophen  MG Oral Tab Take 1 tablet by mouth every 4 (four) hours as needed for Pain.   Taking As Needed    methocarbamol 500 MG Oral Tab Take 1 tablet (500 mg total) by mouth as needed.   Taking As Needed    Cholecalciferol 50 MCG (2000 UT) Oral Tab Take 50 mcg by mouth in the morning.   Taking    acetaminophen 325 MG Oral Tab Take 2 tablets (650 mg total) by mouth every 4 (four) hours as needed.   Taking As Needed    rosuvastatin 5 MG Oral Tab Take 1 tablet (5 mg total) by mouth daily. 90 tablet 3 Taking    metFORMIN HCl  MG Oral Tablet 24 Hr Take 1 tablet (500 mg total) by mouth 2 (two) times a day. (Patient taking differently: Take 1  tablet (500 mg total) by mouth daily.) 180 tablet 1 Taking Differently    Losartan Potassium-HCTZ 100-12.5 MG Oral Tab Take 1 tablet by mouth daily. 90 tablet 3 Taking    BABY ASPIRIN OR Take by mouth.   Taking    CALCIUM OR Take 500 mg by mouth daily.       ascorbic acid 1000 MG Oral Tab Take 3 tablets (3,000 mg total) by mouth daily.       methylPREDNISolone 4 MG Oral Tablet Therapy Pack Take 1 tablet (4 mg total) by mouth As Directed. (Patient not taking: Reported on 4/26/2025)   Not Taking    Glucosamine Sulfate 500 MG Oral Tab 1 or 2 tablets daily. Follow 's instructions (Patient not taking: Reported on 4/26/2025) 60 tablet 11 Not Taking    Fluticasone Propionate (FLONASE) 50 MCG/ACT Nasal Suspension 2 sprays by Nasal route daily. 3 Bottle 1     Timolol Maleate 0.5 % Ophthalmic Solution   1    [3]   Past Surgical History:  Procedure Laterality Date    Colectomy  1999   [4]   Social History  Socioeconomic History    Marital status:    Tobacco Use    Smoking status: Former    Smokeless tobacco: Never   Substance and Sexual Activity    Alcohol use: No     Alcohol/week: 0.0 standard drinks of alcohol    Drug use: No

## 2025-04-27 NOTE — H&P
[facility]   part of [facility]    History and Physical     Efraín Dykes Patient Status:  Inpatient    1960 MRN [id number]   Location [facility] 4NW-A Attending Bryce Grey MD   Hosp Day # 1 PCP Ronnie Turner MD     Chief Complaint: Nausea, vomiting, abdominal pain    History of Present Illness: Efraín Dykes is a 64 year old male with history of hypertension, sleep apnea, prostate cancer, ulcerative colitis with a history of colectomy presenting with nausea, vomiting, abdominal pain.  Patient recently was discharged after spine surgery on Thursday.  Went home on steroids and oral pain meds.  Patient said he took the steroids on Friday with nausea vomiting afterwards.  He had no further steroids or pain meds Friday onwards.  Continue to have episodes on Saturday so he decided come emergency room for further evaluation and treatment.  Patient also had some mild abdominal discomfort generalized in nature. Pt also said his emesis was coffee ground. He has not had any since yesterday. Patient denied any diarrhea.  Patient's had no significant bowel movements since last Monday.  Patient denies any other significant positive review of systems.    Past Medical History:Past Medical History[1]     Past Surgical History: Past Surgical History[2]  Lumbar Fusion     Social History:  reports that he has quit smoking. He has never used smokeless tobacco. He reports that he does not drink alcohol and does not use drugs.No illegal drugs, , 2 children, no cane or walker    Family History: Family History[3]  Mother living  Father passed    Allergies: Allergies[4]    Medications:  Medications Ordered Prior to Encounter[5]    Review of Systems:   A comprehensive 14 point review of systems was completed.    Pertinent positives and negatives noted in the HPI.    Physical Exam:    /74 (BP Location: Left arm)   Pulse 75   Temp 98.4 °F (36.9 °C) (Oral)   Resp 20   Wt 157 lb 3 oz (71.3 kg)    SpO2 97%   BMI 21.92 kg/m²   General: No acute distress. Alert and oriented   HEENT: Normocephalic atraumatic. Moist mucous membranes. EOM-I.  Neck: No JVD. No carotid bruits.  Respiratory: Clear to auscultation bilaterally. No wheezes. No crackles  Cardiovascular: S1, S2. Regular rate and rhythm. No murmurs  Chest and Back: No tenderness or deformity.  Abdomen: Soft, generalized tender, nondistended.  Positive bowel sounds. No rebound, guarding  Neurologic: No focal neurological deficits. CNII-XII grossly intact. Sensation and strength intact  Musculoskeletal: Moves all extremities.  Extremities: No edema or tenderness of the LE  Integument: No new rashes or lesions.   Psychiatric: Appropriate mood and affect.      Diagnostic Data:      Labs:  Recent Labs   Lab 04/26/25  1800 04/27/25  0620   WBC 12.3* 9.1   HGB 13.3 9.9*   MCV 90.9 89.4   .0 233.0   INR 0.93  --        Recent Labs   Lab 04/26/25  1800 04/27/25  0620   * 122*   BUN 31* 22   CREATSERUM 1.17 0.91   CA 10.1 8.9   ALB 4.7 3.5   * 136   K 3.8 3.4*   CL 91* 99   CO2 32.0 30.0   ALKPHO 59 45   AST 21 19   ALT 15 12   BILT 0.9 0.8   TP 7.3 5.4*       CrCl cannot be calculated (Unknown ideal weight.).    Recent Labs   Lab 04/26/25  1800   PTP 12.6   INR 0.93       No results for input(s): \"TROP\", \"CK\" in the last 168 hours.    Imaging: Imaging data reviewed in Epic.      ASSESSMENT / PLAN:   64 year old male with history of hypertension, sleep apnea, prostate cancer, ulcerative colitis with a history of colectomy presenting with nausea, vomiting, abdominal pain.     Abdominal pain  -secondary to constipation and adynamic ileus  -no n/v currently hold off ngt  -consider surgery eval if not improved  -repeat x-ray tomorrow if not improved  -npo  -colace, senna, miralax  -ambulation  -avoid narcotics    Coffee Ground emesis  -etiology uncertain  -hgb lower  -PPI  -GI consulted  -NPO  -hold any AC, antiplatelet agents    HLD  -rosuvastatin      DREW  -cpap    Recent Back surgery  -monitor surgical site  -no obvious infection at this time    Quality:  DVT Prophylaxis: scd  CODE status:   Chalo:     Plan of care discussed with patient and staff    Dispo: no discharge    MD Katty Winn Hospitalist  [phone]                 [1]   Past Medical History:   Essential hypertension    Hearing loss    DREW (obstructive sleep apnea)    AHI 8 RDI 16 REM AHI 12 SaO2 murali 81 %    Prostate cancer (HCC)    Ulcerative colitis (HCC)   [2]   Past Surgical History:  Procedure Laterality Date    Colectomy  1999   [3] History reviewed. No pertinent family history.  [4] No Known Allergies  [5]   No current facility-administered medications on file prior to encounter.     Current Outpatient Medications on File Prior to Encounter   Medication Sig Dispense Refill    HYDROcodone-acetaminophen  MG Oral Tab Take 1 tablet by mouth every 4 (four) hours as needed for Pain.      methocarbamol 500 MG Oral Tab Take 1 tablet (500 mg total) by mouth as needed.      Cholecalciferol 50 MCG (2000 UT) Oral Tab Take 50 mcg by mouth in the morning.      acetaminophen 325 MG Oral Tab Take 2 tablets (650 mg total) by mouth every 4 (four) hours as needed.      rosuvastatin 5 MG Oral Tab Take 1 tablet (5 mg total) by mouth daily. 90 tablet 3    metFORMIN HCl  MG Oral Tablet 24 Hr Take 1 tablet (500 mg total) by mouth 2 (two) times a day. (Patient taking differently: Take 1 tablet (500 mg total) by mouth daily.) 180 tablet 1    Losartan Potassium-HCTZ 100-12.5 MG Oral Tab Take 1 tablet by mouth daily. 90 tablet 3    BABY ASPIRIN OR Take by mouth.      CALCIUM OR Take 500 mg by mouth daily.      ascorbic acid 1000 MG Oral Tab Take 3 tablets (3,000 mg total) by mouth daily.      methylPREDNISolone 4 MG Oral Tablet Therapy Pack Take 1 tablet (4 mg total) by mouth As Directed. (Patient not taking: Reported on 4/26/2025)      Glucosamine Sulfate 500 MG Oral Tab 1 or 2 tablets  daily. Follow 's instructions (Patient not taking: Reported on 4/26/2025) 60 tablet 11    Fluticasone Propionate (FLONASE) 50 MCG/ACT Nasal Suspension 2 sprays by Nasal route daily. 3 Bottle 1    Timolol Maleate 0.5 % Ophthalmic Solution   1

## 2025-04-27 NOTE — OPERATIVE REPORT
[facility]    Efraín Dykes Patient Status:  Inpatient    1960 MRN [id number]   Location [facility]OS[facility] Attending Bryce Grey MD   Hosp Day # 1 PCP Ronnie Turner MD         PATIENT NAME: Efraín Dykes  DATE OF OPERATION: 2025    PREOPERATIVE DIAGNOSIS:  Coffee ground emesis  Anemia  Hx total colectomy for UC  POSTOPERATIVE DIAGNOSIS:  Moderate to severe ulcerative esophagitis. Clean based.  Likely source for coffee grounds and anemia. Low rebleed risk  Hiatal hernia    PROCEDURE PERFORMED: Upper endoscopy with MAC sedation  SURGEON: Derick Magdaleno MD   MEDICATIONS: MAC IV in divided doses under the supervision of Anesthesia.  PROCEDURE AND FINDINGS: The patient was placed into the left lateral decubitus position after informed consent was obtained.  All questions were answered.  MAC IV sedation was administered.  The Olympus video gastroscope was introduced into the mouth and passed to the third portion of the duodenum.  There was moderate to severe circumferential ulcerative esophagitis in the distal esophagus.  Clean based ulcers low rebleed risk. There was a hiatal hernia in the stomach but the remainder of The entire examined stomach,  including retroflexion view, was normal.  The entire examined duodenum was normal.   The gastroscope was removed from the patient.  The procedure was completed. There were no implants placed nor significant blood loss.  The patient tolerated the procedure well. There were no immediate post procedure complications.  Moderate sedation time:  None.  Deep sedation provided by anesthesia    RECOMMENDATIONS:  Regular diet  Continue BID PPI therapy.  DC today or tomorrow if stable    Derick Magdaleno MD

## 2025-04-27 NOTE — ED QUICK NOTES
Orders for admission, patient is aware of plan and ready to go upstairs. Any questions, please call ED RN srinivas at extension [phone].     Patient Covid vaccination status: Fully vaccinated     COVID Test Ordered in ED: None    COVID Suspicion at Admission: N/A    Running Infusions: Medication Infusions[1]     Mental Status/LOC at time of transport: a&ox4    Other pertinent information:   CIWA score: N/A   NIH score:  N/A             [1]

## 2025-04-27 NOTE — PLAN OF CARE
Problem: GASTROINTESTINAL - ADULT  Goal: Minimal or absence of nausea and vomiting  Description: INTERVENTIONS:- Maintain adequate hydration with IV or PO as ordered and tolerated- Nasogastric tube to low intermittent suction as ordered- Evaluate effectiveness of ordered antiemetic medications- Provide nonpharmacologic comfort measures as appropriate- Advance diet as tolerated, if ordered- Obtain nutritional consult as needed- Evaluate fluid balance  Outcome: Progressing  Goal: Maintains or returns to baseline bowel function  Description: INTERVENTIONS:- Assess bowel function- Maintain adequate hydration with IV or PO as ordered and tolerated- Evaluate effectiveness of GI medications- Encourage mobilization and activity- Obtain nutritional consult as needed- Establish a toileting routine/schedule- Consider collaborating with pharmacy to review patient's medication profile  Outcome: Not Progressing     Problem: GASTROINTESTINAL - ADULT  Goal: Minimal or absence of nausea and vomiting  Description: INTERVENTIONS:- Maintain adequate hydration with IV or PO as ordered and tolerated- Nasogastric tube to low intermittent suction as ordered- Evaluate effectiveness of ordered antiemetic medications- Provide nonpharmacologic comfort measures as appropriate- Advance diet as tolerated, if ordered- Obtain nutritional consult as needed- Evaluate fluid balance  Outcome: Progressing  Goal: Maintains or returns to baseline bowel function  Description: INTERVENTIONS:- Assess bowel function- Maintain adequate hydration with IV or PO as ordered and tolerated- Evaluate effectiveness of GI medications- Encourage mobilization and activity- Obtain nutritional consult as needed- Establish a toileting routine/schedule- Consider collaborating with pharmacy to review patient's medication profile  Outcome: Not Progressing     Problem: GASTROINTESTINAL - ADULT  Goal: Minimal or absence of nausea and vomiting  Description: INTERVENTIONS:-  Maintain adequate hydration with IV or PO as ordered and tolerated- Nasogastric tube to low intermittent suction as ordered- Evaluate effectiveness of ordered antiemetic medications- Provide nonpharmacologic comfort measures as appropriate- Advance diet as tolerated, if ordered- Obtain nutritional consult as needed- Evaluate fluid balance  Outcome: Progressing     Problem: GASTROINTESTINAL - ADULT  Goal: Maintains or returns to baseline bowel function  Description: INTERVENTIONS:- Assess bowel function- Maintain adequate hydration with IV or PO as ordered and tolerated- Evaluate effectiveness of GI medications- Encourage mobilization and activity- Obtain nutritional consult as needed- Establish a toileting routine/schedule- Consider collaborating with pharmacy to review patient's medication profile  Outcome: Not Progressing  Kept npo today for Egd,Consent was signed,No emesis noted,Denies any pain, Ambulates in the room, Attended needs, Dressing on mid back from lumbar surgery.

## 2025-04-28 ENCOUNTER — TELEPHONE (OUTPATIENT)
Dept: ORTHOPEDICS CLINIC | Facility: CLINIC | Age: 65
End: 2025-04-28

## 2025-04-28 VITALS
BODY MASS INDEX: 22 KG/M2 | WEIGHT: 157.19 LBS | HEART RATE: 64 BPM | TEMPERATURE: 98 F | RESPIRATION RATE: 18 BRPM | SYSTOLIC BLOOD PRESSURE: 135 MMHG | OXYGEN SATURATION: 96 % | DIASTOLIC BLOOD PRESSURE: 68 MMHG

## 2025-04-28 LAB
ANION GAP SERPL CALC-SCNC: 7 MMOL/L (ref 0–18)
BASOPHILS # BLD AUTO: 0.04 X10(3) UL (ref 0–0.2)
BASOPHILS NFR BLD AUTO: 0.4 %
BUN BLD-MCNC: 19 MG/DL (ref 9–23)
CALCIUM BLD-MCNC: 8.6 MG/DL (ref 8.7–10.6)
CHLORIDE SERPL-SCNC: 102 MMOL/L (ref 98–112)
CO2 SERPL-SCNC: 29 MMOL/L (ref 21–32)
CREAT BLD-MCNC: 0.96 MG/DL (ref 0.7–1.3)
EGFRCR SERPLBLD CKD-EPI 2021: 88 ML/MIN/1.73M2 (ref 60–?)
EOSINOPHIL # BLD AUTO: 0.27 X10(3) UL (ref 0–0.7)
EOSINOPHIL NFR BLD AUTO: 3 %
ERYTHROCYTE [DISTWIDTH] IN BLOOD BY AUTOMATED COUNT: 12.3 %
GLUCOSE BLD-MCNC: 98 MG/DL (ref 70–99)
HCT VFR BLD AUTO: 29.2 % (ref 39–53)
HGB BLD-MCNC: 10.2 G/DL (ref 13–17.5)
IMM GRANULOCYTES # BLD AUTO: 0.13 X10(3) UL (ref 0–1)
IMM GRANULOCYTES NFR BLD: 1.5 %
LYMPHOCYTES # BLD AUTO: 1.61 X10(3) UL (ref 1–4)
LYMPHOCYTES NFR BLD AUTO: 18 %
MAGNESIUM SERPL-MCNC: 2.1 MG/DL (ref 1.6–2.6)
MCH RBC QN AUTO: 32.1 PG (ref 26–34)
MCHC RBC AUTO-ENTMCNC: 34.9 G/DL (ref 31–37)
MCV RBC AUTO: 91.8 FL (ref 80–100)
MONOCYTES # BLD AUTO: 0.79 X10(3) UL (ref 0.1–1)
MONOCYTES NFR BLD AUTO: 8.8 %
NEUTROPHILS # BLD AUTO: 6.11 X10 (3) UL (ref 1.5–7.7)
NEUTROPHILS # BLD AUTO: 6.11 X10(3) UL (ref 1.5–7.7)
NEUTROPHILS NFR BLD AUTO: 68.3 %
OSMOLALITY SERPL CALC.SUM OF ELEC: 288 MOSM/KG (ref 275–295)
PLATELET # BLD AUTO: 241 10(3)UL (ref 150–450)
POTASSIUM SERPL-SCNC: 3.7 MMOL/L (ref 3.5–5.1)
POTASSIUM SERPL-SCNC: 3.7 MMOL/L (ref 3.5–5.1)
RBC # BLD AUTO: 3.18 X10(6)UL (ref 3.8–5.8)
SODIUM SERPL-SCNC: 138 MMOL/L (ref 136–145)
WBC # BLD AUTO: 9 X10(3) UL (ref 4–11)

## 2025-04-28 PROCEDURE — 80048 BASIC METABOLIC PNL TOTAL CA: CPT | Performed by: HOSPITALIST

## 2025-04-28 PROCEDURE — 85025 COMPLETE CBC W/AUTO DIFF WBC: CPT | Performed by: HOSPITALIST

## 2025-04-28 PROCEDURE — 83735 ASSAY OF MAGNESIUM: CPT | Performed by: HOSPITALIST

## 2025-04-28 PROCEDURE — 84132 ASSAY OF SERUM POTASSIUM: CPT | Performed by: HOSPITALIST

## 2025-04-28 RX ORDER — METFORMIN HYDROCHLORIDE 500 MG/1
500 TABLET, EXTENDED RELEASE ORAL DAILY
Status: SHIPPED | COMMUNITY
Start: 2025-04-28

## 2025-04-28 NOTE — PROGRESS NOTES
NURSING DISCHARGE NOTE    Discharged Home via Wheelchair.  Accompanied by Support staff  Belongings Taken by patient/family   Dc patient in stable condition,Saline lock removed,Dc instruction  And priscription given,Instructed on follow up appt,Verbalized needs..

## 2025-04-28 NOTE — ANESTHESIA POSTPROCEDURE EVALUATION
[facility]    Efraín Dykes Patient Status:  Inpatient   Age/Gender 64 year old male MRN [id number]   Location [facility] 4NW-A Attending Bryce Grey MD   Hosp Day # 1 PCP Ronnie Turner MD       Anesthesia Post-op Note    ESOPHAGOGASTRODUODENOSCOPY (EGD)    Procedure Summary       Date: 04/27/25 Room / Location:  ENDOSCOPY 02 /  ENDOSCOPY    Anesthesia Start: 1255 Anesthesia Stop: 1317    Procedure: ESOPHAGOGASTRODUODENOSCOPY (EGD) Diagnosis:       Abdominal pain      (ULCERATIVE ESOPHAGITIS, HIATAL HERNIA)    Surgeons: Derick Magdaleno MD Anesthesiologist: Landry Bird MD    Anesthesia Type: MAC ASA Status: 2            Anesthesia Type: MAC    Vitals Value Taken Time   /65 04/27/25 13:30   Temp 99.1 °F (37.3 °C) 04/27/25 13:30   Pulse 61 04/27/25 13:30   Resp  04/27/25 20:25   SpO2 94 % 04/27/25 13:30           Patient Location: Endoscopy    Anesthesia Type: MAC    Airway Patency: patent    Postop Pain Control: adequate    Mental Status: preanesthetic baseline    Nausea/Vomiting: none    Cardiopulmonary/Hydration status: stable euvolemic    Complications: no apparent anesthesia related complications    Postop vital signs: stable    Dental Exam: Unchanged from Preop

## 2025-04-28 NOTE — TELEPHONE ENCOUNTER
Patient called to request an anti-inflammatory, patiented stated that the one they gave him in the hospital worked pretty good and would like to have it if possible. Please advise.

## 2025-04-28 NOTE — PLAN OF CARE
Received pt a/o x4. VSS. Afebrile. Denies pain. Medication admin per MAR. Tolerating diet. Ambulating within room. Lumbar dressing intact. Reports still feeling constipated. Call light within reach. Safety precautions in place.     Problem: GASTROINTESTINAL - ADULT  Goal: Minimal or absence of nausea and vomiting  Description: INTERVENTIONS:- Maintain adequate hydration with IV or PO as ordered and tolerated- Nasogastric tube to low intermittent suction as ordered- Evaluate effectiveness of ordered antiemetic medications- Provide nonpharmacologic comfort measures as appropriate- Advance diet as tolerated, if ordered- Obtain nutritional consult as needed- Evaluate fluid balance  Outcome: Progressing     Problem: GASTROINTESTINAL - ADULT  Goal: Maintains or returns to baseline bowel function  Description: INTERVENTIONS:- Assess bowel function- Maintain adequate hydration with IV or PO as ordered and tolerated- Evaluate effectiveness of GI medications- Encourage mobilization and activity- Obtain nutritional consult as needed- Establish a toileting routine/schedule- Consider collaborating with pharmacy to review patient's medication profile  Outcome: Progressing

## 2025-04-28 NOTE — TELEPHONE ENCOUNTER
Called and spoke w/ patient.   Patient states he is not requesting an anti-inflammatory. He is home from the hospital. He is not taking pain meds.

## 2025-04-28 NOTE — PROGRESS NOTES
ART Hospitalist Progress Note                                                                     [facility]   part of [facility]      Efraín Dykes  5/12/1960    SUBJECTIVE: No chest pain, palpitations, shortness of breath, cough, nausea, vomiting, abdominal pain. Pt with BM    OBJECTIVE:  Temp:  [98.2 °F (36.8 °C)-99.1 °F (37.3 °C)] 98.5 °F (36.9 °C)  Pulse:  [61-82] 66  Resp:  [16-20] 20  BP: ()/(61-74) 132/72  SpO2:  [93 %-98 %] 98 %  Exam  Gen: No acute distress, alert and oriented   Pulm: Lungs clear bilaterally, normal respiratory effort, no crackles, no wheezing  CV: Heart with regular rate and rhythm, no murmur.  Abd: Abdomen soft, nontender, nondistended, bowel sounds present  MSK: No significant pitting edema or tenderness of the LE  Skin: no new rashes or lesions    Labs:   Recent Labs   Lab 04/26/25  1800 04/27/25  0620 04/27/25  1454 04/28/25  0648   WBC 12.3* 9.1  --  9.0   HGB 13.3 9.9* 9.9* 10.2*   MCV 90.9 89.4  --  91.8   .0 233.0  --  241.0   INR 0.93  --   --   --        Recent Labs   Lab 04/26/25  1800 04/27/25  0620 04/28/25  0648   * 136 138   K 3.8 3.4* 3.7  3.7   CL 91* 99 102   CO2 32.0 30.0 29.0   BUN 31* 22 19   CREATSERUM 1.17 0.91 0.96   CA 10.1 8.9 8.6*   MG  --  2.2 2.1   * 122* 98       Recent Labs   Lab 04/26/25  1800 04/27/25  0620   ALT 15 12   AST 21 19   ALB 4.7 3.5       No results for input(s): \"PGLU\" in the last 168 hours.    Meds:   Scheduled: Scheduled Medications[1]  Continuous Infusions: Medication Infusions[2]  PRN: PRN Medications[3]    ASSESSMENT / PLAN:   64 year old male with history of hypertension, sleep apnea, prostate cancer, ulcerative colitis with a history of colectomy presenting with nausea, vomiting, abdominal pain.      Abdominal pain--> resolved  -secondary to constipation and adynamic ileus  -colace, senna, miralax--> pt with BM  -GI consulted --> EGD --> mod to severe  ulcerative esophagitis --> PPI BID  -ambulation  -avoid narcotics     [place] emesis  -etiology uncertain  -hgb lower  -PPI  -GI consulted --> EGD --> mod to severe ulcerative esophagitis --> PPI BID on dc  -NPO--> Regular --> tolerating   -hold any AC, antiplatelet agents     HLD  -rosuvastatin      DREW  -cpap     Recent Back surgery  -monitor surgical site  -no obvious infection at this time     Quality:  DVT Prophylaxis: scd  CODE status:   Isabel:      Plan of care discussed with patient and staff     Dispo: ok for discharge     Bryce Grey MD  [facility] Hospitalist  [phone]           [1]    docusate sodium  100 mg Oral BID    sennosides  8.6 mg Oral BID    polyethylene glycol (PEG 3350)  17 g Oral Daily    pantoprazole  40 mg Oral BID AC    rosuvastatin  5 mg Oral Daily   [2]    lactated ringers 100 mL/hr at 04/27/25 1315   [3]   HYDROcodone-acetaminophen    methocarbamol    melatonin    ondansetron

## 2025-05-05 NOTE — PROGRESS NOTES
PHYSICIAN CLARIFICATION    Additional information related to patient's anemia.    Acute blood loss anemia    This note is part of the patient's medical record.

## 2025-05-07 DIAGNOSIS — Z98.1 S/P LUMBAR FUSION: Primary | ICD-10-CM

## 2025-05-08 ENCOUNTER — HOSPITAL ENCOUNTER (OUTPATIENT)
Dept: GENERAL RADIOLOGY | Age: 65
Discharge: HOME | End: 2025-05-08
Attending: PHYSICIAN ASSISTANT
Payer: MEDICARE

## 2025-05-08 ENCOUNTER — OFFICE VISIT (OUTPATIENT)
Dept: ORTHOPEDICS CLINIC | Facility: CLINIC | Age: 65
End: 2025-05-08
Payer: MEDICARE

## 2025-05-08 DIAGNOSIS — Z98.1 S/P LUMBAR FUSION: ICD-10-CM

## 2025-05-08 DIAGNOSIS — C61 PROSTATE CANCER (HCC): ICD-10-CM

## 2025-05-08 PROCEDURE — 72100 X-RAY EXAM L-S SPINE 2/3 VWS: CPT | Performed by: PHYSICIAN ASSISTANT

## 2025-05-08 PROCEDURE — 99024 POSTOP FOLLOW-UP VISIT: CPT | Performed by: PHYSICIAN ASSISTANT

## 2025-05-08 NOTE — PROGRESS NOTES
Patient: Efraín Dykes  Medical Record Number: [id number]  Site: [place]  Referring Physician:  No ref. provider found  PCP: Ronnie Turner MD    I have carefully reviewed the patient's intake questionnaire before our encounter today.  HISTORY OF CHIEF COMPLAINT:     Efraín Dykes is a 64 year old male, who presents 2 weeks post op from L3-5 TLIF.  He is doing great.  He is happy with his results from surgery.  He has no pain.  He is not taking pain medicine.  He has no leg pain any longer.  No new numbness and tingling.  He just feels like he has generalized weakness from limited activity after surgery.    VAS Pain Score: 0 /10    The patient’s pre-op symptoms have improved.    The patient denies fevers, sweats or chills.    EXAM:  X-RAY OF LUMBAR SPINE POST OP     CLINICAL INFORMATION:  Patient is Post op fusion.     FINDINGS:    AP and Lateral views of lumbar spine completed.  Post operative changes are seen.   Hardware seen from L3-5    No hardware failure or osteolysis.  No migration of interbody spacer(s).    No fractures seen.  L5-S1 spondylosis    IMPRESSION:    Post op x-ray of lumbar spine.  See findings for details.        Current Medications[1]     PHYSICAL EXAMIMATION   PHYSICAL EXAMINATION: Efraín Dykes is a 64 year old   male who is observed sitting comfortably on a chair in the exam room alert and oriented times three. RESPIRATORY RATE: 18 He looks consistent his stated age.      There were no vitals taken for this visit.    Inspection: No acute distress.     Coordination: Well coordinated, Fluent gait  Lumbar/Sacral Integument: Incision/ incisions; well healed with good wound approximation no weeping/ drainage erythema or fluctuance  Palpation:  See chart below:  Palpation of Lumbar Area Right   (POS or NEG) Left   (POS or NEG)   Lumbar paraspinals Neg Neg   SIJ Neg Neg   Trochanteric Bursa Neg Neg     Strength: Strength of bilateral  lower extremities:     Left Right    EHL 5/5 5/5    DF 5/5 5/5    PF 5/5 5/5    Quads 5/5 5/5    IP 5/5 5/5  Sensation: No sensory deficits noted on bilateral lower extremities       Tests:  Test Right   (POS or NEG) Left   (POS or NEG)   SLR Neg Neg       MEDICAL DECISION MAKING:   Impression: Post op Lumbar Fusion TLIF/PLIF  Plan: Patient is doing great.  He is very happy with the results from the surgery.  His preop weakness is improved.  His preop pain is gone.  He will continue to work on walking more.  We are going to hold off on physical therapy at this point.  Concerns were addressed.  X-rays were reviewed and discussed with the patient.  Restrictions and limitations were reviewed with the patient.  Concerns were addressed.  Questions were encouraged and answered.  Patient voiced understanding.  Images were reviewed and discussed with the patient.  They voiced understanding of what the images showed.    Patient will continue to monitor surgical incisions.  Patient to call office with any development of fever, sweats, chills or worsening of condition.    The patient has the following work restrictions: Off work as of today until follow up appointment  The patient indicates understanding of these issues and agrees to the plan.  Misha Mar PA-C     This note was dictated using Dragon software. While it was briefly proofread prior to completion, some grammatical, spelling, and word choice errors due to dictation may still occur.       [1]   Current Outpatient Medications   Medication Sig Dispense Refill    metFORMIN  MG Oral Tablet 24 Hr Take 1 tablet (500 mg total) by mouth daily.      pantoprazole 40 MG Oral Tab EC Take 1 tablet (40 mg total) by mouth 2 (two) times daily before meals. 60 tablet 1    CALCIUM OR Take 500 mg by mouth daily.      ascorbic acid 1000 MG Oral Tab Take 3 tablets (3,000 mg total) by mouth daily.      methocarbamol 500 MG Oral Tab Take 1 tablet (500 mg total) by mouth as needed.       Cholecalciferol 50 MCG (2000 UT) Oral Tab Take 50 mcg by mouth in the morning.      acetaminophen 325 MG Oral Tab Take 2 tablets (650 mg total) by mouth every 4 (four) hours as needed.      rosuvastatin 5 MG Oral Tab Take 1 tablet (5 mg total) by mouth daily. 90 tablet 3    Losartan Potassium-HCTZ 100-12.5 MG Oral Tab Take 1 tablet by mouth daily. 90 tablet 3    HYDROcodone-acetaminophen  MG Oral Tab Take 1 tablet by mouth every 4 (four) hours as needed for Pain. (Patient not taking: Reported on 5/8/2025)

## (undated) DEVICE — KIT CUSTOM ENDOPROCEDURE STERIS

## (undated) DEVICE — 3M™ RED DOT™ MONITORING ELECTRODE WITH FOAM TAPE AND STICKY GEL, 50/BAG, 20/CASE, 72/PLT 2570: Brand: RED DOT™

## (undated) DEVICE — 1200CC GUARDIAN II: Brand: GUARDIAN

## (undated) DEVICE — 10FT COMBINED O2 DELIVERY/CO2 MONITORING. FILTER WITH MICROSTREAM TYPE LUER: Brand: DUAL ADULT NASAL CANNULA

## (undated) DEVICE — V2 SPECIMEN COLLECTION MANIFOLD KIT: Brand: NEPTUNE

## (undated) DEVICE — BITEBLOCK ENDOSCP 60FR MAXI STRP

## (undated) DEVICE — KIT VLV 5 PC AIR H2O SUCT BX ENDOGATOR CONN

## (undated) NOTE — LETTER
[address]  [address]  Authorization for Surgical Operation and Procedure     Date:___________                                                                                                         Time:__________  I hereby authorize Surgeon(s):  Derick Magdaleno MD, my physician and his/her assistants (if applicable), which may include medical students, residents, and/or fellows, to perform the following surgical operation/ procedure and administer such anesthesia as may be determined necessary by my physician:  Operation/Procedure name (s) Procedure(s):  ESOPHAGOGASTRODUODENOSCOPY (EGD) on Efraín Dykes   2.   I recognize that during the surgical operation/procedure, unforeseen conditions may necessitate additional or different procedures than those listed above.  I, therefore, further authorize and request that the above-named surgeon, assistants, or designees perform such procedures as are, in their judgment, necessary and desirable.    3.   My surgeon/physician has discussed prior to my surgery the potential benefits, risks and side effects of this procedure; the likelihood of achieving goals; and potential problems that might occur during recuperation.  They also discussed reasonable alternatives to the procedure, including risks, benefits, and side effects related to the alternatives and risks related to not receiving this procedure.  I have had all my questions answered and I acknowledge that no guarantee has been made as to the result that may be obtained.    4.   Should the need arise during my operation/procedure, which includes change of level of care prior to discharge, I also consent to the administration of blood and/or blood products.  Further, I understand that despite careful testing and screening of blood or blood products by collecting agencies, I may still be subject to ill effects as a result of receiving a blood transfusion and/or blood products.  The  following are some, but not all, of the potential risks that can occur: fever and allergic reactions, hemolytic reactions, transmission of diseases such as Hepatitis, AIDS and Cytomegalovirus (CMV) and fluid overload.  In the event that I wish to have an autologous transfusion of my own blood, or a directed donor transfusion, I will discuss this with my physician.  Check only if Refusing Blood or Blood Products  I understand refusal of blood or blood products as deemed necessary by my physician may have serious consequences to my condition to include possible death. I hereby assume responsibility for my refusal and release the hospital, its personnel, and my physicians from any responsibility for the consequences of my refusal.          o  Refuse      5.   I authorize the use of any specimen, organs, tissues, body parts or foreign objects that may be removed from my body during the operation/procedure for diagnosis, research or teaching purposes and their subsequent disposal by hospital authorities.  I also authorize the release of specimen test results and/or written reports to my treating physician on the hospital medical staff or other referring or consulting physicians involved in my care, at the discretion of the Pathologist or my treating physician.    6.   I consent to the photographing or videotaping of the operations or procedures to be performed, including appropriate portions of my body for medical, scientific, or educational purposes, provided my identity is not revealed by the pictures or by descriptive texts accompanying them.  If the procedure has been photographed/videotaped, the surgeon will obtain the original picture, image, videotape or CD.  The hospital will not be responsible for storage, release or maintenance of the picture, image, tape or CD.    7.   I consent to the presence of a  or observers in the operating room as deemed necessary by my physician or their designees.     8.   I recognize that in the event my procedure results in extended X-Ray/fluoroscopy time, I may develop a skin reaction.    9. If I have a Do Not Attempt Resuscitation (DNAR) order in place, that status will be suspended while in the operating room, procedural suite, and during the recovery period unless otherwise explicitly stated by me (or a person authorized to consent on my behalf). The surgeon or my attending physician will determine when the applicable recovery period ends for purposes of reinstating the DNAR order.  10. Patients having a sterilization procedure: I understand that if the procedure is successful the results will be permanent and it will therefore be impossible for me to inseminate, conceive, or bear children.  I also understand that the procedure is intended to result in sterility, although the result has not been guaranteed.   11. I acknowledge that my physician has explained sedation/analgesia administration to me including the risk and benefits I consent to the administration of sedation/analgesia as may be necessary or desirable in the judgment of my physician.    I CERTIFY THAT I HAVE READ AND FULLY UNDERSTAND THE ABOVE CONSENT TO OPERATION and/or OTHER PROCEDURE.    _________________________________________  __________________________________  Signature of Patient     Signature of Responsible Person         ___________________________________         Printed Name of Responsible Person           _________________________________                 Relationship to Patient  _________________________________________  ______________________________  Signature of Witness          Date  Time      Patient Name: Efraín SOLORZANO Jania     : 1960                 Printed: 2025     Medical Record #: [id number]                     Page 1 of [address] [place] [address] [place]  [address]    Consent for Anesthesia    I, Efraín Dykes agree to  be cared for by an anesthesiologist, who is specially trained to monitor me and give me medicine to put me to sleep or keep me comfortable during my procedure    I understand that my anesthesiologist is not an employee or agent of [facility] or [employer]. He or she works for [employer].    As the patient asking for anesthesia services, I agree to:  Allow the anesthesiologist (anesthesia doctor) to give me medicine and do additional procedures as necessary. Some examples are: Starting or using an “IV” to give me medicine, fluids or blood during my procedure, and having a breathing tube placed to help me breathe when I’m asleep (intubation). In the event that my heart stops working properly, I understand that my anesthesiologist will make every effort to sustain my life, unless otherwise directed by [facility] Do Not Resuscitate documents.  Tell my anesthesia doctor before my procedure:  If I am pregnant.  The last time that I ate or drank.  All of the medicines I take (including prescriptions, herbal supplements, and pills I can buy without a prescription (including street drugs/illegal medications). Failure to inform my anesthesiologist about these medicines may increase my risk of anesthetic complications.  If I am allergic to anything or have had a reaction to anesthesia before.  I understand how the anesthesia medicine will help me (benefits).  I understand that with any type of anesthesia medicine there are risks:  The most common risks are: nausea, vomiting, sore throat, muscle soreness, damage to my eyes, mouth, or teeth (from breathing tube placement).  Rare risks include: remembering what happened during my procedure, allergic reactions to medications, injury to my airway, heart, lungs, vision, nerves, or muscles and in extremely rare instances death.  My doctor has explained to me other choices available to me for my care (alternatives).  Pregnant Patients  (“epidural”):  I understand that the risks of having an epidural (medicine given into my back to help control pain during labor), include itching, low blood pressure, difficulty urinating, headache or slowing of the baby’s heart. Very rare risks include infection, bleeding, seizure, irregular heart rhythms and nerve injury.  Regional Anesthesia (“spinal”, “epidural”, & “nerve blocks”):  I understand that rare but potential complications include headache, bleeding, infection, seizure, irregular heart rhythms, and nerve injury.    I can change my mind about having anesthesia services at any time before I get the medicine.    _____________________________________________________________________________  Patient (or Representative) Signature/Relationship to Patient  Date   Time    _____________________________________________________________________________   Name (if used)    Language/Organization   Time    _____________________________________________________________________________  Anesthesiologist Signature     Date   Time  I have discussed the procedure and information above with the patient (or patient’s representative) and answered their questions. The patient or their representative has agreed to have anesthesia services.    _____________________________________________________________________________  Witness        Date   Time  I have verified that the signature is that of the patient or patient’s representative, and that it was signed before the procedure  Patient Name: Efraín Dykes     : 1960                 Printed: 2025     Medical Record #: [id number]                     Page 2 of 2

## (undated) NOTE — LETTER
[address]  [address]  Authorization for Surgical Operation and Procedure     Date:___________                                                                                                         Time:__________  I hereby authorize Surgeon(s):  Derick Magdaleno MD, my physician and his/her assistants (if applicable), which may include medical students, residents, and/or fellows, to perform the following surgical operation/ procedure and administer such anesthesia as may be determined necessary by my physician:  Operation/Procedure name (s) Procedure(s):  ESOPHAGOGASTRODUODENOSCOPY (EGD) on Efraín Dykes   2.   I recognize that during the surgical operation/procedure, unforeseen conditions may necessitate additional or different procedures than those listed above.  I, therefore, further authorize and request that the above-named surgeon, assistants, or designees perform such procedures as are, in their judgment, necessary and desirable.    3.   My surgeon/physician has discussed prior to my surgery the potential benefits, risks and side effects of this procedure; the likelihood of achieving goals; and potential problems that might occur during recuperation.  They also discussed reasonable alternatives to the procedure, including risks, benefits, and side effects related to the alternatives and risks related to not receiving this procedure.  I have had all my questions answered and I acknowledge that no guarantee has been made as to the result that may be obtained.    4.   Should the need arise during my operation/procedure, which includes change of level of care prior to discharge, I also consent to the administration of blood and/or blood products.  Further, I understand that despite careful testing and screening of blood or blood products by collecting agencies, I may still be subject to ill effects as a result of receiving a blood transfusion and/or blood products.  The  following are some, but not all, of the potential risks that can occur: fever and allergic reactions, hemolytic reactions, transmission of diseases such as Hepatitis, AIDS and Cytomegalovirus (CMV) and fluid overload.  In the event that I wish to have an autologous transfusion of my own blood, or a directed donor transfusion, I will discuss this with my physician.  Check only if Refusing Blood or Blood Products  I understand refusal of blood or blood products as deemed necessary by my physician may have serious consequences to my condition to include possible death. I hereby assume responsibility for my refusal and release the hospital, its personnel, and my physicians from any responsibility for the consequences of my refusal.          o  Refuse      5.   I authorize the use of any specimen, organs, tissues, body parts or foreign objects that may be removed from my body during the operation/procedure for diagnosis, research or teaching purposes and their subsequent disposal by hospital authorities.  I also authorize the release of specimen test results and/or written reports to my treating physician on the hospital medical staff or other referring or consulting physicians involved in my care, at the discretion of the Pathologist or my treating physician.    6.   I consent to the photographing or videotaping of the operations or procedures to be performed, including appropriate portions of my body for medical, scientific, or educational purposes, provided my identity is not revealed by the pictures or by descriptive texts accompanying them.  If the procedure has been photographed/videotaped, the surgeon will obtain the original picture, image, videotape or CD.  The hospital will not be responsible for storage, release or maintenance of the picture, image, tape or CD.    7.   I consent to the presence of a  or observers in the operating room as deemed necessary by my physician or their designees.     8.   I recognize that in the event my procedure results in extended X-Ray/fluoroscopy time, I may develop a skin reaction.    9. If I have a Do Not Attempt Resuscitation (DNAR) order in place, that status will be suspended while in the operating room, procedural suite, and during the recovery period unless otherwise explicitly stated by me (or a person authorized to consent on my behalf). The surgeon or my attending physician will determine when the applicable recovery period ends for purposes of reinstating the DNAR order.  10. Patients having a sterilization procedure: I understand that if the procedure is successful the results will be permanent and it will therefore be impossible for me to inseminate, conceive, or bear children.  I also understand that the procedure is intended to result in sterility, although the result has not been guaranteed.   11. I acknowledge that my physician has explained sedation/analgesia administration to me including the risk and benefits I consent to the administration of sedation/analgesia as may be necessary or desirable in the judgment of my physician.    I CERTIFY THAT I HAVE READ AND FULLY UNDERSTAND THE ABOVE CONSENT TO OPERATION and/or OTHER PROCEDURE.    _________________________________________  __________________________________  Signature of Patient     Signature of Responsible Person         ___________________________________         Printed Name of Responsible Person           _________________________________                 Relationship to Patient  _________________________________________  ______________________________  Signature of Witness          Date  Time      Patient Name: Efraín SOLORZANO Jania     : 1960                 Printed: 2025     Medical Record #: [id number]                     Page 2 of [address] [place] [address] [place]  [address]    Consent for Anesthesia    I, Efraín Dykes agree to  be cared for by an anesthesiologist, who is specially trained to monitor me and give me medicine to put me to sleep or keep me comfortable during my procedure    I understand that my anesthesiologist is not an employee or agent of [facility] or [employer]. He or she works for [employer].    As the patient asking for anesthesia services, I agree to:  Allow the anesthesiologist (anesthesia doctor) to give me medicine and do additional procedures as necessary. Some examples are: Starting or using an “IV” to give me medicine, fluids or blood during my procedure, and having a breathing tube placed to help me breathe when I’m asleep (intubation). In the event that my heart stops working properly, I understand that my anesthesiologist will make every effort to sustain my life, unless otherwise directed by [facility] Do Not Resuscitate documents.  Tell my anesthesia doctor before my procedure:  If I am pregnant.  The last time that I ate or drank.  All of the medicines I take (including prescriptions, herbal supplements, and pills I can buy without a prescription (including street drugs/illegal medications). Failure to inform my anesthesiologist about these medicines may increase my risk of anesthetic complications.  If I am allergic to anything or have had a reaction to anesthesia before.  I understand how the anesthesia medicine will help me (benefits).  I understand that with any type of anesthesia medicine there are risks:  The most common risks are: nausea, vomiting, sore throat, muscle soreness, damage to my eyes, mouth, or teeth (from breathing tube placement).  Rare risks include: remembering what happened during my procedure, allergic reactions to medications, injury to my airway, heart, lungs, vision, nerves, or muscles and in extremely rare instances death.  My doctor has explained to me other choices available to me for my care (alternatives).  Pregnant Patients  (“epidural”):  I understand that the risks of having an epidural (medicine given into my back to help control pain during labor), include itching, low blood pressure, difficulty urinating, headache or slowing of the baby’s heart. Very rare risks include infection, bleeding, seizure, irregular heart rhythms and nerve injury.  Regional Anesthesia (“spinal”, “epidural”, & “nerve blocks”):  I understand that rare but potential complications include headache, bleeding, infection, seizure, irregular heart rhythms, and nerve injury.    I can change my mind about having anesthesia services at any time before I get the medicine.    _____________________________________________________________________________  Patient (or Representative) Signature/Relationship to Patient  Date   Time    _____________________________________________________________________________   Name (if used)    Language/Organization   Time    _____________________________________________________________________________  Anesthesiologist Signature     Date   Time  I have discussed the procedure and information above with the patient (or patient’s representative) and answered their questions. The patient or their representative has agreed to have anesthesia services.    _____________________________________________________________________________  Witness        Date   Time  I have verified that the signature is that of the patient or patient’s representative, and that it was signed before the procedure  Patient Name: Efraín Dykes     : 1960                 Printed: 2025     Medical Record #: [id number]                     Page 3 of 3